# Patient Record
Sex: FEMALE | Race: BLACK OR AFRICAN AMERICAN | Employment: PART TIME | URBAN - METROPOLITAN AREA
[De-identification: names, ages, dates, MRNs, and addresses within clinical notes are randomized per-mention and may not be internally consistent; named-entity substitution may affect disease eponyms.]

---

## 2017-03-29 ENCOUNTER — HOSPITAL ENCOUNTER (INPATIENT)
Facility: HOSPITAL | Age: 22
LOS: 2 days | Discharge: HOME | DRG: 638 | End: 2017-03-31
Attending: EMERGENCY MEDICINE | Admitting: HOSPITALIST
Payer: MEDICAID

## 2017-03-29 ENCOUNTER — APPOINTMENT (OUTPATIENT)
Dept: ULTRASOUND IMAGING | Facility: HOSPITAL | Age: 22
DRG: 638 | End: 2017-03-29
Attending: HOSPITALIST
Payer: MEDICAID

## 2017-03-29 DIAGNOSIS — IMO0001 INSULIN DEPENDENT DIABETES MELLITUS: ICD-10-CM

## 2017-03-29 DIAGNOSIS — E87.1 HYPONATREMIA: ICD-10-CM

## 2017-03-29 DIAGNOSIS — R10.13 EPIGASTRIC PAIN: ICD-10-CM

## 2017-03-29 DIAGNOSIS — R73.9 HYPERGLYCEMIA: Primary | ICD-10-CM

## 2017-03-29 PROCEDURE — 76700 US EXAM ABDOM COMPLETE: CPT

## 2017-03-29 PROCEDURE — 99223 1ST HOSP IP/OBS HIGH 75: CPT | Performed by: HOSPITALIST

## 2017-03-29 RX ORDER — DEXTROSE AND SODIUM CHLORIDE 5; .45 G/100ML; G/100ML
100 INJECTION, SOLUTION INTRAVENOUS CONTINUOUS PRN
Status: DISCONTINUED | OUTPATIENT
Start: 2017-03-29 | End: 2017-03-31

## 2017-03-29 RX ORDER — FAMOTIDINE 10 MG/ML
20 INJECTION, SOLUTION INTRAVENOUS ONCE
Status: COMPLETED | OUTPATIENT
Start: 2017-03-29 | End: 2017-03-29

## 2017-03-29 RX ORDER — KETOROLAC TROMETHAMINE 30 MG/ML
30 INJECTION, SOLUTION INTRAMUSCULAR; INTRAVENOUS EVERY 6 HOURS PRN
Status: DISCONTINUED | OUTPATIENT
Start: 2017-03-29 | End: 2017-03-31

## 2017-03-29 RX ORDER — DEXTROSE MONOHYDRATE 25 G/50ML
50 INJECTION, SOLUTION INTRAVENOUS
Status: DISCONTINUED | OUTPATIENT
Start: 2017-03-29 | End: 2017-03-31

## 2017-03-29 RX ORDER — HEPARIN SODIUM 5000 [USP'U]/ML
5000 INJECTION, SOLUTION INTRAVENOUS; SUBCUTANEOUS EVERY 8 HOURS
Status: DISCONTINUED | OUTPATIENT
Start: 2017-03-29 | End: 2017-03-31

## 2017-03-29 RX ORDER — ONDANSETRON 2 MG/ML
4 INJECTION INTRAMUSCULAR; INTRAVENOUS EVERY 6 HOURS PRN
Status: DISCONTINUED | OUTPATIENT
Start: 2017-03-29 | End: 2017-03-31

## 2017-03-29 RX ORDER — SODIUM CHLORIDE 9 MG/ML
INJECTION, SOLUTION INTRAVENOUS CONTINUOUS
Status: DISCONTINUED | OUTPATIENT
Start: 2017-03-29 | End: 2017-03-31

## 2017-03-29 RX ORDER — DEXTROSE MONOHYDRATE 25 G/50ML
50 INJECTION, SOLUTION INTRAVENOUS
Status: DISCONTINUED | OUTPATIENT
Start: 2017-03-29 | End: 2017-03-29

## 2017-03-29 RX ORDER — KETOROLAC TROMETHAMINE 30 MG/ML
15 INJECTION, SOLUTION INTRAMUSCULAR; INTRAVENOUS EVERY 6 HOURS PRN
Status: DISCONTINUED | OUTPATIENT
Start: 2017-03-29 | End: 2017-03-31

## 2017-03-29 NOTE — ED INITIAL ASSESSMENT (HPI)
Pt with on and off abdominal pain for the last month the is epigastric but also having back pain and lower back cramps.

## 2017-03-30 PROCEDURE — 99233 SBSQ HOSP IP/OBS HIGH 50: CPT | Performed by: INTERNAL MEDICINE

## 2017-03-30 NOTE — CONSULTS
[facility]      Endocrinology Consultation    Osmar Alonso Patient Status:  Inpatient    1995 MRN [id number]   [facility] 4SW-A Attending Garrison Corbett MD   Hosp Day # 1 PCP None Pcp     Reason for Consultation:  DKA    Hi reviewed. No pertinent past surgical history. Family History   Problem Relation Age of Onset   • Cancer Maternal Grandmother    • Diabetes Paternal Grandfather    • Diabetes Sister    • Diabetes Brother       reports that she has never smoked.  She does no stated age  Eyes: no proptosis, lid lag, or stare  Neck:  no thyromegaly   Lymph: no anterior cervical or supraclavicular lymphadenopathy  CV: regular rate and rhythm, nl S1/S2, no LE edema bilaterally  Resp: clear to auscultation bilaterally, non-labored went down to 125 and glucose is in the 400s again.   - Continue levemir 15 units Q 12 hours   - Novolog 1:10 grams of carbs  - Novolog 1:10 > 140 QID  - Will follow and adjust. Likely can go home tomorrow after 1 more day of observation.  - Will need lantu

## 2017-03-30 NOTE — PLAN OF CARE
Diabetes/Glucose Control    • Glucose maintained within prescribed range Progressing        Patient/Family Goals    • Patient/Family Long Term Goal Progressing    • Patient/Family Short Term Goal Progressing        Received patient this am awake and alert.

## 2017-03-30 NOTE — CONSULTS
[facility]  Diabetes Clinical Nurse Specialist Consult Note    Nimco Harrington Patient Status:  Inpatient    1995 MRN [id number]   [facility] 4SW-A Attending David Abdalla MD   Hosp Day # 1 PCP None Pcp     Reason for [facility] peanut butter, reviewed the rule of 15 and reason for 15 grams of pure carbohydrate. She said that she will go low after going high if having pure carbohydrate, I reinforced that she can have a protein snack after correcting the low blood sugar.  \"I haven'

## 2017-03-30 NOTE — ED NOTES
Unable to give bedside report because patient is in ultrasound. Report given to [facility] and floor nurse.

## 2017-03-30 NOTE — H&P
[facility]  History and Physical     Royce Hatter Patient Status:  Emergency    1995 MRN [id number]   Location [address] [facility] Attending Goldy Gooden MD   Hosp Day # 0 PCP None Pcp     Chief Complaint: Kari Alamo Respiratory: Clear to auscultation bilaterally. Cardiovascular: S1, S2. Regular rate and rhythm. No murmurs, rubs or gallops. Equal pulses. Abdomen: Soft, tender epigastric and RUQ, nondistended. Positive bowel sounds.  No rebound, guarding or organo

## 2017-03-30 NOTE — PROGRESS NOTES
ICU  Critical Care APN Progress Note    NAME: Freeman Rangel - ROOM: [facility]-A - MRN: [id number] - Age: 25year old - :1995    History Of Present Illness:  Freeman Rangel is a 25year old female with PMHx significant for T1DM since 4yrs of age-- atraumatic, no cyanosis or edema,capillary refill <3 sec.     Pulses: 2+ and symmetric all extremities  Skin: Skin color, texture, turgor normal for ethnicity, no rashes or lesions, warm and dry  Neurologic: CNII-XII intact, normal strength    Data this adm D5.45NS once glucose is below 250 mg/dl  -Follow electrolytes and replete as incdicated  -NPO except sips with meds or ice chips--eat when okay with endo    Proph:  -SQ heparin  -Protonix  -SCD    Dispo:   -Full code  -ICU for glucose management and close

## 2017-03-30 NOTE — ED NOTES
Assumed care of patient who is currently at [address] to ICU per Guillermina RN will handoff Insulin with ICU

## 2017-03-30 NOTE — DISCHARGE SUMMARY
[facility]  DISCHARGE SUMMARY     Nimco Harrington Patient Status:  Inpatient    1995 MRN [id number]   [facility] 4SW-A Attending David Abdalla MD   [facility] Day # 1 PCP None Pcp     Date of Admission: 3/29/2017  Date of Discha and her pain was controlled prior to DC.      Procedures during hospitalization:   • none    Incidental or significant findings and recommendations (brief descriptions):  • none    Lab/Test results pending at Discharge:   · none    Consultants:  • CC, Endoc

## 2017-03-30 NOTE — PROGRESS NOTES
[facility]  Progress Note     Tobias Negron Patient Status:  Inpatient    1995 MRN [id number]   [facility] 4SW-A Attending Bassam Singleton MD   [facility] Day # 1 PCP None Pcp     Chief Complaint: hyperglycemia    S: Patient wit detemir  15 Units Subcutaneous Q12H   • insulin aspart  1-30 Units Subcutaneous TID CC   • insulin aspart  1-30 Units Subcutaneous TID CC and HS   • Heparin Sodium (Porcine)  5,000 Units Subcutaneous Q8H   • pantoprazole (PROTONIX) IV push  40 mg Intraveno

## 2017-03-30 NOTE — CONSULTS
Pulmonary / Critical Care H&P/Consult       NAME: Fabienne Snowden - ROOM: 453/453-A - MRN: [id number] - Age: 25year old - :  1995    Date of Admission: 3/29/2017  6:01 PM  Admission Diagnosis: Hyponatremia [E87.1]  Epigastric pain [R10.13]  Hyperg daily. Disp:  Rfl:        Scheduled Medication:  • Heparin Sodium (Porcine)  5,000 Units Subcutaneous Q8H   • pantoprazole (PROTONIX) IV push  40 mg Intravenous Q24H     Continuous Infusing Medication:  • sodium chloride 150 mL/hr at 03/29/17 2205   • Dext gallop   Abdomen:     Soft, mild ruq / epigastric pain, no r/g.  + bs   Extremities:   Extremities normal, atraumatic, no cyanosis or edema   Pulses:   2+ and symmetric all extremities   Skin:   Skin color, texture, turgor normal, no rashes or lesions

## 2017-03-30 NOTE — PAYOR COMM NOTE
Attending Physician: Giuseppe Jackson MD    Review Type: ADMISSION   Reviewer: Kiana Lora       Date: March 30, 2017 - 1:35 PM  Payor: Junaid Flores  Authorization Number: N/A  Admit date: 3/29/2017  6:01 PM   Admitted from Emergency Dept.: yes    H&P by T facility-administered medications on file prior to encounter. No current outpatient prescriptions on file prior to encounter.     Review of Systems:   A comprehensive 14 point review of systems was completed.     Pertinent positives and negatives noted in drip  3. Accuchecks  4. Serial BMP q 8 hours  5.  Endo consult    Quality:  · DVT Prophylaxis: Heparin    Plan of care discussed with patient and ER team.    Vin Fajardo MD  3/29/2017                      MEDICATIONS ADMINISTERED IN LAST 1 DAY:  dextrose 3/30/2017 0521 New Bag 0.5 Units/hr Intravenous Ann-Marie Morales RN    3/30/2017 0306 Rate/Dose Change 2 Units/hr Intravenous Ann-Marie Morales RN    3/30/2017 0209 Rate/Dose Change 3 Units/hr Intravenous Ann-Marie Morales RN    3/30/2017 0107 Rate/Dose C normal limits   HEMOGLOBIN A1C - Abnormal; Notable for the following:     HgbA1C 12.1 (*)     Estimated Average Glucose 301 (*)     All other components within normal limits   TRIGLYCERIDES - Abnormal; Notable for the following:     Triglycerides 132 (*) following:     POC Glucose 156 (*)     All other components within normal limits   POCT GLUCOSE - Abnormal; Notable for the following:     POC Glucose 118 (*)     All other components within normal limits   POCT GLUCOSE - Abnormal; Notable for the followin

## 2017-03-30 NOTE — DIETARY NOTE
Nutrition Short Note      Consult received for carb counting, pt type 1 diabetic. Pt states she is familiar with the diet. Reviewed and provided handouts on carbohydrate counting/label reading.  Discussed the role of CHO/protein and fat in BS control, jerry

## 2017-03-30 NOTE — PLAN OF CARE
Diabetes/Glucose Control    • Glucose maintained within prescribed range Progressing        Patient/Family Goals    • Patient/Family Long Term Goal Progressing    • Patient/Family Short Term Goal Progressing            Received pt from ED for abd pain and

## 2017-03-30 NOTE — ED PROVIDER NOTES
Patient Seen in: [facility] Emergency Department    History   Patient presents with:  Abdomen/Flank Pain (GI/)    Stated Complaint: abd pain    HPI    Patient is a 81-year-old had a history of insulin dependent diabetes he says that she has been h light reflex and normal landmarks. Oropharynx shows moist mucous membranes with no erythema or exudate. Uvula midline, no drooling, no stridor. Neck is supple with no lymphadenopathy or meningismus. CHEST: Lungs are clear to auscultation bilaterally. normal limits   CBC W/ DIFFERENTIAL - Abnormal; Notable for the following:     HGB 11.4 (*)     All other components within normal limits   MAGNESIUM - Normal   PHOSPHORUS - Normal   LIPASE - Normal   CBC WITH DIFFERENTIAL WITH PLATELET    Narrative:     T care.        Disposition and Plan     Clinical Impression:  Hyperglycemia  (primary encounter diagnosis)  Insulin dependent diabetes mellitus ([facility] [address]  Epigastric pain  Hyponatremia    Disposition:  Admit    Follow-up:  No follow-up provider specified.     Med

## 2017-03-31 VITALS
RESPIRATION RATE: 17 BRPM | BODY MASS INDEX: 25.7 KG/M2 | TEMPERATURE: 98 F | DIASTOLIC BLOOD PRESSURE: 58 MMHG | SYSTOLIC BLOOD PRESSURE: 106 MMHG | HEART RATE: 86 BPM | WEIGHT: 150.5 LBS | HEIGHT: 64 IN | OXYGEN SATURATION: 100 %

## 2017-03-31 PROCEDURE — 99239 HOSP IP/OBS DSCHRG MGMT >30: CPT | Performed by: INTERNAL MEDICINE

## 2017-03-31 RX ORDER — POTASSIUM CHLORIDE 20 MEQ/1
40 TABLET, EXTENDED RELEASE ORAL ONCE
Status: COMPLETED | OUTPATIENT
Start: 2017-03-31 | End: 2017-03-31

## 2017-03-31 RX ORDER — CALCIUM CARB/VITAMIN D3/VIT K1 500-100-40
TABLET,CHEWABLE ORAL
Qty: 100 EACH | Refills: 0 | Status: SHIPPED | OUTPATIENT
Start: 2017-03-31

## 2017-03-31 NOTE — PROGRESS NOTES
[facility]  Endocrinology Progress Note    Nimco Harrington Patient Status:  Inpatient    1995 MRN [id number]   [facility] 3NE-A Attending David Abdalla MD   Hosp Day # 2 PCP None Pcp     CC: Patient presents with:  Abdomen/Fl qam and 15 qpm and humalog pens 20 TID with meals and pt has follow-up appointment 4/6/17 at the [facility] [place]      Plan of care discussed with patient/family at bedside. All questions/concerns addressed as fully as possible.      Endocrine service

## 2017-03-31 NOTE — PLAN OF CARE
NURSING DISCHARGE NOTE    Discharged Home via Ambulatory. Accompanied by self  Belongings Taken by patient/family. Patient given discharge instructions and paperwork. Verbalizes understanding.

## 2017-03-31 NOTE — PLAN OF CARE
Assumed patient care at 0. Patient A&O x 4 with no complaints of pain or discomfort at this time. Patient appetite increasing. Insulin given overnight per MAR. VSS. Possible D/C tomorrow pending patient status.  Patient is currently lying in bed, bed is

## 2017-06-20 ENCOUNTER — OFFICE VISIT (OUTPATIENT)
Dept: ENDOCRINOLOGY CLINIC | Facility: CLINIC | Age: 22
End: 2017-06-20

## 2017-06-20 VITALS
TEMPERATURE: 98 F | SYSTOLIC BLOOD PRESSURE: 106 MMHG | HEART RATE: 88 BPM | HEIGHT: 64 IN | WEIGHT: 152 LBS | DIASTOLIC BLOOD PRESSURE: 68 MMHG | BODY MASS INDEX: 25.95 KG/M2 | RESPIRATION RATE: 18 BRPM

## 2017-06-20 DIAGNOSIS — E10.69 UNCONTROLLED TYPE 1 DIABETES MELLITUS WITH OTHER SPECIFIED COMPLICATION: Primary | ICD-10-CM

## 2017-06-20 DIAGNOSIS — E10.10 DIABETIC KETOACIDOSIS WITHOUT COMA ASSOCIATED WITH TYPE 1 DIABETES MELLITUS (HCC): ICD-10-CM

## 2017-06-20 DIAGNOSIS — E10.65 UNCONTROLLED TYPE 1 DIABETES MELLITUS WITH OTHER SPECIFIED COMPLICATION: Primary | ICD-10-CM

## 2017-06-20 PROBLEM — IMO0002 UNCONTROLLED TYPE 1 DIABETES MELLITUS: Status: ACTIVE | Noted: 2017-06-20

## 2017-06-20 PROCEDURE — 99214 OFFICE O/P EST MOD 30 MIN: CPT | Performed by: NURSE PRACTITIONER

## 2017-06-20 PROCEDURE — 83036 HEMOGLOBIN GLYCOSYLATED A1C: CPT | Performed by: NURSE PRACTITIONER

## 2017-06-21 NOTE — PROGRESS NOTES
CC: Patient presents with:  Diabetes: new pt. referred by hospital.      HISTORY:  Past Medical History   Diagnosis Date   • Type 1 diabetes mellitus ([facility] [address]    • Hyperlipidemia    • Anxiety    • Insomnia 2/2017      No past surgical history on file.    Galileo Galicia 230,15,298,491,[phone] mg/dl late day: 176 mg/dl    Hypoglycemia frequency early am 2 episodes, late am 1 episode   Hyperlipidemia: severe around most of midsection of abdomen where she mainly injects and thighs     Hypertension:  Blood Pressure: daily, Disp: 400 each, Rfl: 0  •  Glucose Blood (ONETOUCH TEST) In Vitro Strip, 4 times daily, Disp: 400 each, Rfl: 0    Exam:  /68 mmHg  Pulse 88  Temp(Src) 98.2 °F (36.8 °C) (Oral)  Resp 18  Ht 64\"  Wt 152 lb  BMI 26.08 kg/m2  LMP 06/12/2017 (Exac insulin adjustment.          Orders Placed This Encounter  Hgb A1C    Meds & Refills for this Visit:  Signed Prescriptions Disp Refills    Insulin Lispro (HUMALOG KWIKPEN) 100 UNIT/ML Subcutaneous Solution Pen-injector 30 mL 0      Sig: Inject 20 Units into

## 2017-07-05 ENCOUNTER — HOSPITAL (OUTPATIENT)
Dept: OTHER | Age: 22
End: 2017-07-05
Attending: EMERGENCY MEDICINE

## 2017-07-05 LAB
ALBUMIN SERPL-MCNC: 3.4 GM/DL (ref 3.6–5.1)
ALBUMIN/GLOB SERPL: 0.7 {RATIO} (ref 1–2.4)
ALP SERPL-CCNC: 107 UNIT/L (ref 45–117)
ALT SERPL-CCNC: 27 UNIT/L
AMORPH SED URNS QL MICRO: ABNORMAL
ANALYZER ANC (IANC): ABNORMAL
ANION GAP SERPL CALC-SCNC: 15 MMOL/L (ref 10–20)
APPEARANCE UR: CLEAR
APPEARANCE UR: CLEAR
AST SERPL-CCNC: 29 UNIT/L
BASOPHILS # BLD: 0 THOUSAND/MCL (ref 0–0.3)
BASOPHILS NFR BLD: 0 %
BILIRUB SERPL-MCNC: 0.2 MG/DL (ref 0.2–1)
BILIRUB UR QL STRIP: NEGATIVE
BILIRUB UR QL: NEGATIVE
BUN SERPL-MCNC: 19 MG/DL (ref 6–20)
BUN/CREAT SERPL: 39 (ref 7–25)
CALCIUM SERPL-MCNC: 9.1 MG/DL (ref 8.4–10.2)
CAOX CRY URNS QL MICRO: ABNORMAL
CHLORIDE: 107 MMOL/L (ref 98–107)
CO2 SERPL-SCNC: 25 MMOL/L (ref 21–32)
COLOR UR: YELLOW
COLOR UR: YELLOW
CREAT SERPL-MCNC: 0.49 MG/DL (ref 0.51–0.95)
DIFFERENTIAL METHOD BLD: ABNORMAL
EOSINOPHIL # BLD: 0.2 THOUSAND/MCL (ref 0.1–0.5)
EOSINOPHIL NFR BLD: 2 %
EPITH CASTS #/AREA URNS LPF: ABNORMAL /[LPF]
ERYTHROCYTE [DISTWIDTH] IN BLOOD: 13.3 % (ref 11–15)
FATTY CASTS #/AREA URNS LPF: ABNORMAL /[LPF]
GLOBULIN SER-MCNC: 4.8 GM/DL (ref 2–4)
GLUCOSE BLDC GLUCOMTR-MCNC: 140 MG/DL (ref 65–99)
GLUCOSE BLDC GLUCOMTR-MCNC: 248 MG/DL (ref 65–99)
GLUCOSE BLDC GLUCOMTR-MCNC: 53 MG/DL (ref 65–99)
GLUCOSE SERPL-MCNC: 109 MG/DL (ref 65–99)
GLUCOSE UR STRIP-MCNC: 500 MG/DL
GLUCOSE UR-MCNC: >500 MG/DL
GRAN CASTS #/AREA URNS LPF: ABNORMAL /[LPF]
HCG POINT OF CARE (5HGRST): NEGATIVE
HEMATOCRIT: 40.3 % (ref 36–46.5)
HEMOCCULT STL QL: NEGATIVE
HG POINT OF CARE QC: NORMAL
HGB BLD-MCNC: 13.2 GM/DL (ref 12–15.5)
HGB UR QL: NEGATIVE
HYALINE CASTS #/AREA URNS LPF: ABNORMAL /[LPF]
KETONES UR STRIP-MCNC: NEGATIVE MG/DL
KETONES UR-MCNC: NEGATIVE MG/DL
LEUKOCYTE ESTERASE UR QL STRIP: NEGATIVE
LEUKOCYTE ESTERASE UR QL STRIP: NEGATIVE
LIPASE SERPL-CCNC: 113 UNIT/L (ref 73–393)
LYMPHOCYTES # BLD: 1.1 THOUSAND/MCL (ref 1.2–5.2)
LYMPHOCYTES NFR BLD: 10 %
MCH RBC QN AUTO: 27.2 PG (ref 26–34)
MCHC RBC AUTO-ENTMCNC: 32.8 GM/DL (ref 32–36.5)
MCV RBC AUTO: 82.9 FL (ref 78–100)
MICROSCOPIC (MT): ABNORMAL
MIXED CELL CASTS #/AREA URNS LPF: ABNORMAL /[LPF]
MONOCYTES # BLD: 0.8 THOUSAND/MCL (ref 0.3–0.9)
MONOCYTES NFR BLD: 7 %
MUCOUS THREADS URNS QL MICRO: ABNORMAL
NEUTROPHILS # BLD: 9.4 THOUSAND/MCL (ref 1.8–8)
NEUTROPHILS NFR BLD: 81 %
NEUTS SEG NFR BLD: ABNORMAL %
NITRITE UR QL STRIP: NEGATIVE
NITRITE UR QL: NEGATIVE
PERCENT NRBC: ABNORMAL
PH UR STRIP: 7 UNIT (ref 5–7)
PH UR: 7 UNIT (ref 5–7)
PLATELET # BLD: 300 THOUSAND/MCL (ref 140–450)
POTASSIUM SERPL-SCNC: 3.3 MMOL/L (ref 3.4–5.1)
PROT SERPL-MCNC: 8.2 GM/DL (ref 6.4–8.2)
PROT UR QL: NEGATIVE MG/DL
PROT UR STRIP-MCNC: ABNORMAL MG/DL
RBC # BLD: 4.86 MILLION/MCL (ref 4–5.2)
RBC CASTS #/AREA URNS LPF: ABNORMAL /[LPF]
RENAL EPI CELLS #/AREA URNS HPF: ABNORMAL /[HPF]
SODIUM SERPL-SCNC: 144 MMOL/L (ref 135–145)
SP GR UR STRIP: 1.02 (ref 1–1.03)
SP GR UR: 1.02 (ref 1–1.03)
SPECIMEN SOURCE: ABNORMAL
SPERM URNS QL MICRO: ABNORMAL
T VAGINALIS URNS QL MICRO: ABNORMAL
TRI-PHOS CRY URNS QL MICRO: ABNORMAL
URATE CRY URNS QL MICRO: ABNORMAL
URNS CMNT MICRO: ABNORMAL
UROBILINOGEN UR QL: 0.2 MG/DL (ref 0–1)
UROBILINOGEN UR STRIP-MCNC: 0.2 MG/DL (ref 0–1)
WAXY CASTS #/AREA URNS LPF: ABNORMAL /[LPF]
WBC # BLD: 11.5 THOUSAND/MCL (ref 4.2–11)
WBC CASTS #/AREA URNS LPF: ABNORMAL /[LPF]
YEAST HYPHAE URNS QL MICRO: ABNORMAL
YEAST URNS QL MICRO: ABNORMAL

## 2017-07-24 ENCOUNTER — HOSPITAL (OUTPATIENT)
Dept: OTHER | Age: 22
End: 2017-07-24
Attending: FAMILY MEDICINE

## 2017-07-24 LAB
ALBUMIN SERPL-MCNC: 2.6 GM/DL (ref 3.6–5.1)
ALBUMIN SERPL-MCNC: 3.2 GM/DL (ref 3.6–5.1)
ALBUMIN/GLOB SERPL: 0.6 {RATIO} (ref 1–2.4)
ALBUMIN/GLOB SERPL: 0.7 {RATIO} (ref 1–2.4)
ALP SERPL-CCNC: 114 UNIT/L (ref 45–117)
ALP SERPL-CCNC: 145 UNIT/L (ref 45–117)
ALT SERPL-CCNC: 16 UNIT/L
ALT SERPL-CCNC: 19 UNIT/L
AMORPH SED URNS QL MICRO: ABNORMAL
AMPHETAMINES UR QL SCN>500 NG/ML: NEGATIVE
ANALYZER ANC (IANC): ABNORMAL
ANALYZER ANC (IANC): ABNORMAL
ANION GAP SERPL CALC-SCNC: 17 MMOL/L (ref 10–20)
ANION GAP SERPL CALC-SCNC: 18 MMOL/L (ref 10–20)
ANION GAP SERPL CALC-SCNC: 19 MMOL/L (ref 10–20)
APAP SERPL-MCNC: <2 MCG/ML (ref 10–30)
APPEARANCE UR: CLEAR
AST SERPL-CCNC: 25 UNIT/L
AST SERPL-CCNC: 26 UNIT/L
B-OH-BUTYR SERPL-SCNC: 0.3 MMOL/L (ref 0–0.3)
B-OH-BUTYR SERPL-SCNC: 0.4 MMOL/L (ref 0–0.3)
BACTERIA #/AREA URNS HPF: ABNORMAL /HPF
BARBITURATES UR QL SCN>200 NG/ML: NEGATIVE
BASE DEFICIT BLDA-SCNC: 7 MMOL/L (ref 0–2)
BASE EXCESS BLDA CALC-SCNC: ABNORMAL MMOL/L
BASOPHILS # BLD: 0 THOUSAND/MCL (ref 0–0.3)
BASOPHILS # BLD: 0 THOUSAND/MCL (ref 0–0.3)
BASOPHILS NFR BLD: 0 %
BASOPHILS NFR BLD: 1 %
BDY SITE: ABNORMAL
BENZODIAZ UR QL SCN>200 NG/ML: NEGATIVE
BILIRUB SERPL-MCNC: 0.2 MG/DL (ref 0.2–1)
BILIRUB SERPL-MCNC: 0.2 MG/DL (ref 0.2–1)
BILIRUB UR QL: NEGATIVE
BODY TEMPERATURE: ABNORMAL
BUN SERPL-MCNC: 13 MG/DL (ref 6–20)
BUN SERPL-MCNC: 13 MG/DL (ref 6–20)
BUN SERPL-MCNC: 17 MG/DL (ref 6–20)
BUN/CREAT SERPL: 22 (ref 7–25)
BUN/CREAT SERPL: 25 (ref 7–25)
BUN/CREAT SERPL: 30 (ref 7–25)
BZE UR QL SCN>150 NG/ML: NEGATIVE
CA-I BLD ISE-SCNC: 1.24 MMOL/L (ref 1.15–1.29)
CA-I BLDA-SCNC: 15 % (ref 15–23)
CALCIUM SERPL-MCNC: 7.8 MG/DL (ref 8.4–10.2)
CALCIUM SERPL-MCNC: 8.5 MG/DL (ref 8.4–10.2)
CALCIUM SERPL-MCNC: 9.3 MG/DL (ref 8.4–10.2)
CANNABINOIDS UR QL SCN>50 NG/ML: NEGATIVE
CAOX CRY URNS QL MICRO: ABNORMAL
CHLORIDE BLD-SCNC: 106 MMOL/L (ref 98–107)
CHLORIDE: 103 MMOL/L (ref 98–107)
CHLORIDE: 108 MMOL/L (ref 98–107)
CHLORIDE: 99 MMOL/L (ref 98–107)
CO2 SERPL-SCNC: 18 MMOL/L (ref 21–32)
CO2 SERPL-SCNC: 19 MMOL/L (ref 21–32)
CO2 SERPL-SCNC: 25 MMOL/L (ref 21–32)
COHGB MFR BLD: 0.6 %
COLOR UR: COLORLESS
CONDITION: ABNORMAL
CONDITION: ABNORMAL
CREAT SERPL-MCNC: 0.52 MG/DL (ref 0.51–0.95)
CREAT SERPL-MCNC: 0.57 MG/DL (ref 0.51–0.95)
CREAT SERPL-MCNC: 0.6 MG/DL (ref 0.51–0.95)
DIFFERENTIAL METHOD BLD: ABNORMAL
DIFFERENTIAL METHOD BLD: ABNORMAL
EOSINOPHIL # BLD: 0.1 THOUSAND/MCL (ref 0.1–0.5)
EOSINOPHIL # BLD: 0.3 THOUSAND/MCL (ref 0.1–0.5)
EOSINOPHIL NFR BLD: 2 %
EOSINOPHIL NFR BLD: 4 %
EPITH CASTS #/AREA URNS LPF: ABNORMAL /[LPF]
ERYTHROCYTE [DISTWIDTH] IN BLOOD: 13.6 % (ref 11–15)
ERYTHROCYTE [DISTWIDTH] IN BLOOD: 13.9 % (ref 11–15)
ETHANOL SERPL-MCNC: NORMAL MG/DL
FATTY CASTS #/AREA URNS LPF: ABNORMAL /[LPF]
GLOBULIN SER-MCNC: 4.3 GM/DL (ref 2–4)
GLOBULIN SER-MCNC: 4.9 GM/DL (ref 2–4)
GLUCOSE BLD-MCNC: 423 MG/DL (ref 65–99)
GLUCOSE BLDC GLUCOMTR-MCNC: 201 MG/DL (ref 65–99)
GLUCOSE BLDC GLUCOMTR-MCNC: 201 MG/DL (ref 65–99)
GLUCOSE BLDC GLUCOMTR-MCNC: 259 MG/DL (ref 65–99)
GLUCOSE BLDC GLUCOMTR-MCNC: 287 MG/DL (ref 65–99)
GLUCOSE BLDC GLUCOMTR-MCNC: 303 MG/DL (ref 65–99)
GLUCOSE BLDC GLUCOMTR-MCNC: 422 MG/DL (ref 65–99)
GLUCOSE SERPL-MCNC: 239 MG/DL (ref 65–99)
GLUCOSE SERPL-MCNC: 384 MG/DL (ref 65–99)
GLUCOSE SERPL-MCNC: 500 MG/DL (ref 65–99)
GLUCOSE UR-MCNC: >500 MG/DL
GRAN CASTS #/AREA URNS LPF: ABNORMAL /[LPF]
HCG POINT OF CARE (5HGRST): NEGATIVE
HCO3 BLDA-SCNC: 18 MMOL/L (ref 22–28)
HCT VFR BLD CALC: 33.4 % (ref 36–46.5)
HEMATOCRIT: 32.3 % (ref 36–46.5)
HEMATOCRIT: 36.1 % (ref 36–46.5)
HG POINT OF CARE QC: NORMAL
HGB BLD-MCNC: 10.5 GM/DL (ref 12–15.5)
HGB BLD-MCNC: 10.9 GM/DL (ref 12–15.5)
HGB BLD-MCNC: 11.4 GM/DL (ref 12–15.5)
HGB UR QL: NEGATIVE
HOROWITZ INDEX BLD+IHG-RTO: ABNORMAL MM[HG]
HYALINE CASTS #/AREA URNS LPF: ABNORMAL /LPF (ref 0–5)
KETONES UR-MCNC: 20 MG/DL
LACTATE BLDA-MCNC: 4 MMOL/L
LEUKOCYTE ESTERASE UR QL STRIP: NEGATIVE
LYMPHOCYTES # BLD: 1.6 THOUSAND/MCL (ref 1.2–5.2)
LYMPHOCYTES # BLD: 1.9 THOUSAND/MCL (ref 1.2–5.2)
LYMPHOCYTES NFR BLD: 23 %
LYMPHOCYTES NFR BLD: 29 %
MCH RBC QN AUTO: 26.8 PG (ref 26–34)
MCH RBC QN AUTO: 27.1 PG (ref 26–34)
MCHC RBC AUTO-ENTMCNC: 31.6 GM/DL (ref 32–36.5)
MCHC RBC AUTO-ENTMCNC: 32.5 GM/DL (ref 32–36.5)
MCV RBC AUTO: 83.2 FL (ref 78–100)
MCV RBC AUTO: 84.9 FL (ref 78–100)
METHGB MFR BLD: 0.6 %
MIXED CELL CASTS #/AREA URNS LPF: ABNORMAL /[LPF]
MONOCYTES # BLD: 0.3 THOUSAND/MCL (ref 0.3–0.9)
MONOCYTES # BLD: 0.5 THOUSAND/MCL (ref 0.3–0.9)
MONOCYTES NFR BLD: 4 %
MONOCYTES NFR BLD: 8 %
MUCOUS THREADS URNS QL MICRO: ABNORMAL
NEUTROPHILS # BLD: 3.8 THOUSAND/MCL (ref 1.8–8)
NEUTROPHILS # BLD: 4.8 THOUSAND/MCL (ref 1.8–8)
NEUTROPHILS NFR BLD: 58 %
NEUTROPHILS NFR BLD: 71 %
NEUTS SEG NFR BLD: ABNORMAL %
NEUTS SEG NFR BLD: ABNORMAL %
NITRITE UR QL: NEGATIVE
OPIATES UR QL SCN>300 NG/ML: NEGATIVE
OXYHGB MFR BLD: 97.2 % (ref 94–98)
PCO2 BLDA: 33 MM HG (ref 32–45)
PCP UR QL SCN>25 NG/ML: NEGATIVE
PERCENT NRBC: ABNORMAL
PERCENT NRBC: ABNORMAL
PH BLDA: 7.34 UNIT (ref 7.35–7.45)
PH UR: 5 UNIT (ref 5–7)
PLATELET # BLD: 370 THOUSAND/MCL (ref 140–450)
PLATELET # BLD: 420 THOUSAND/MCL (ref 140–450)
PO2 BLDA: 104 MM HG (ref 83–108)
POTASSIUM BLD-SCNC: 4.7 MMOL/L (ref 3.4–5.1)
POTASSIUM SERPL-SCNC: 4.5 MMOL/L (ref 3.4–5.1)
POTASSIUM SERPL-SCNC: 4.5 MMOL/L (ref 3.4–5.1)
POTASSIUM SERPL-SCNC: 4.6 MMOL/L (ref 3.4–5.1)
PROT SERPL-MCNC: 6.9 GM/DL (ref 6.4–8.2)
PROT SERPL-MCNC: 8.1 GM/DL (ref 6.4–8.2)
PROT UR QL: NEGATIVE MG/DL
RBC # BLD: 3.88 MILLION/MCL (ref 4–5.2)
RBC # BLD: 4.25 MILLION/MCL (ref 4–5.2)
RBC #/AREA URNS HPF: ABNORMAL /HPF (ref 0–3)
RBC CASTS #/AREA URNS LPF: ABNORMAL /[LPF]
RENAL EPI CELLS #/AREA URNS HPF: ABNORMAL /[HPF]
SALICYLATES SERPL-MCNC: <2.8 MG/DL
SAO2 % BLDA: 98 % (ref 95–99)
SODIUM BLD-SCNC: 136 MMOL/L (ref 135–145)
SODIUM SERPL-SCNC: 135 MMOL/L (ref 135–145)
SODIUM SERPL-SCNC: 136 MMOL/L (ref 135–145)
SODIUM SERPL-SCNC: 140 MMOL/L (ref 135–145)
SP GR UR: 1.03 (ref 1–1.03)
SPECIMEN SOURCE: ABNORMAL
SPERM URNS QL MICRO: ABNORMAL
SQUAMOUS #/AREA URNS HPF: ABNORMAL /HPF (ref 0–5)
T VAGINALIS URNS QL MICRO: ABNORMAL
TRI-PHOS CRY URNS QL MICRO: ABNORMAL
TSH SERPL-ACNC: 0.7 MCUNIT/ML (ref 0.46–4.13)
URATE CRY URNS QL MICRO: ABNORMAL
URINE REFLEX: ABNORMAL
URNS CMNT MICRO: ABNORMAL
UROBILINOGEN UR QL: 0.2 MG/DL (ref 0–1)
WAXY CASTS #/AREA URNS LPF: ABNORMAL /[LPF]
WBC # BLD: 6.5 THOUSAND/MCL (ref 4.2–11)
WBC # BLD: 6.9 THOUSAND/MCL (ref 4.2–11)
WBC #/AREA URNS HPF: ABNORMAL /HPF (ref 0–5)
WBC CASTS #/AREA URNS LPF: ABNORMAL /[LPF]
YEAST HYPHAE URNS QL MICRO: ABNORMAL
YEAST URNS QL MICRO: ABNORMAL

## 2017-07-25 LAB
ALBUMIN SERPL-MCNC: 2.4 GM/DL (ref 3.6–5.1)
ALBUMIN/GLOB SERPL: 0.6 {RATIO} (ref 1–2.4)
ALP SERPL-CCNC: 115 UNIT/L (ref 45–117)
ALT SERPL-CCNC: 15 UNIT/L
ANALYZER ANC (IANC): ABNORMAL
ANION GAP SERPL CALC-SCNC: 13 MMOL/L (ref 10–20)
APTT PPP: 29 SECONDS (ref 22–30)
APTT PPP: NORMAL S
AST SERPL-CCNC: 16 UNIT/L
BASOPHILS # BLD: 0 THOUSAND/MCL (ref 0–0.3)
BASOPHILS NFR BLD: 0 %
BILIRUB SERPL-MCNC: 0.1 MG/DL (ref 0.2–1)
BUN SERPL-MCNC: 12 MG/DL (ref 6–20)
BUN/CREAT SERPL: 24 (ref 7–25)
CALCIUM SERPL-MCNC: 8.2 MG/DL (ref 8.4–10.2)
CHLORIDE: 105 MMOL/L (ref 98–107)
CO2 SERPL-SCNC: 22 MMOL/L (ref 21–32)
CREAT SERPL-MCNC: 0.5 MG/DL (ref 0.51–0.95)
DIFFERENTIAL METHOD BLD: ABNORMAL
EOSINOPHIL # BLD: 0.3 THOUSAND/MCL (ref 0.1–0.5)
EOSINOPHIL NFR BLD: 5 %
ERYTHROCYTE [DISTWIDTH] IN BLOOD: 14.1 % (ref 11–15)
GLOBULIN SER-MCNC: 4.2 GM/DL (ref 2–4)
GLUCOSE BLDC GLUCOMTR-MCNC: 104 MG/DL (ref 65–99)
GLUCOSE BLDC GLUCOMTR-MCNC: 104 MG/DL (ref 65–99)
GLUCOSE BLDC GLUCOMTR-MCNC: 142 MG/DL (ref 65–99)
GLUCOSE BLDC GLUCOMTR-MCNC: 187 MG/DL (ref 65–99)
GLUCOSE BLDC GLUCOMTR-MCNC: 188 MG/DL (ref 65–99)
GLUCOSE BLDC GLUCOMTR-MCNC: 247 MG/DL (ref 65–99)
GLUCOSE BLDC GLUCOMTR-MCNC: 26 MG/DL (ref 65–99)
GLUCOSE BLDC GLUCOMTR-MCNC: 268 MG/DL (ref 65–99)
GLUCOSE SERPL-MCNC: 225 MG/DL (ref 65–99)
HEMATOCRIT: 32.6 % (ref 36–46.5)
HGB BLD-MCNC: 10.6 GM/DL (ref 12–15.5)
INR PPP: 1
LACTATE BLDV-SCNC: 2.2 MMOL/L (ref 0–2)
LYMPHOCYTES # BLD: 2.2 THOUSAND/MCL (ref 1.2–5.2)
LYMPHOCYTES NFR BLD: 37 %
MAGNESIUM SERPL-MCNC: 1.5 MG/DL (ref 1.7–2.4)
MCH RBC QN AUTO: 27.2 PG (ref 26–34)
MCHC RBC AUTO-ENTMCNC: 32.5 GM/DL (ref 32–36.5)
MCV RBC AUTO: 83.6 FL (ref 78–100)
MONOCYTES # BLD: 0.4 THOUSAND/MCL (ref 0.3–0.9)
MONOCYTES NFR BLD: 8 %
NEUTROPHILS # BLD: 3 THOUSAND/MCL (ref 1.8–8)
NEUTROPHILS NFR BLD: 50 %
NEUTS SEG NFR BLD: ABNORMAL %
PERCENT NRBC: ABNORMAL
PHOSPHATE SERPL-MCNC: 2.7 MG/DL (ref 2.4–4.7)
PLATELET # BLD: 347 THOUSAND/MCL (ref 140–450)
POTASSIUM SERPL-SCNC: 3.9 MMOL/L (ref 3.4–5.1)
PROT SERPL-MCNC: 6.6 GM/DL (ref 6.4–8.2)
PROTHROMBIN TIME: 11.1 SECONDS (ref 9.7–11.8)
PROTHROMBIN TIME: NORMAL
RBC # BLD: 3.9 MILLION/MCL (ref 4–5.2)
SODIUM SERPL-SCNC: 136 MMOL/L (ref 135–145)
WBC # BLD: 5.9 THOUSAND/MCL (ref 4.2–11)

## 2017-07-26 ENCOUNTER — DIAGNOSTIC TRANS (OUTPATIENT)
Dept: OTHER | Age: 22
End: 2017-07-26

## 2017-08-12 ENCOUNTER — HOSPITAL ENCOUNTER (EMERGENCY)
Facility: HOSPITAL | Age: 22
Discharge: HOME | End: 2017-08-12
Attending: EMERGENCY MEDICINE
Payer: COMMERCIAL

## 2017-08-12 VITALS
BODY MASS INDEX: 24.66 KG/M2 | OXYGEN SATURATION: 98 % | WEIGHT: 148 LBS | DIASTOLIC BLOOD PRESSURE: 73 MMHG | SYSTOLIC BLOOD PRESSURE: 110 MMHG | RESPIRATION RATE: 20 BRPM | TEMPERATURE: 98 F | HEIGHT: 65 IN | HEART RATE: 101 BPM

## 2017-08-12 DIAGNOSIS — T74.21XA SEXUAL ASSAULT OF ADULT, INITIAL ENCOUNTER: Primary | ICD-10-CM

## 2017-08-12 LAB
POCT LOT NUMBER: NORMAL
POCT URINE PREGNANCY: NEGATIVE

## 2017-08-12 PROCEDURE — 99285 EMERGENCY DEPT VISIT HI MDM: CPT

## 2017-08-12 PROCEDURE — 96372 THER/PROPH/DIAG INJ SC/IM: CPT

## 2017-08-12 PROCEDURE — 81025 URINE PREGNANCY TEST: CPT

## 2017-08-12 RX ORDER — CEFTRIAXONE SODIUM 250 MG/1
250 INJECTION, POWDER, FOR SOLUTION INTRAMUSCULAR; INTRAVENOUS ONCE
Status: COMPLETED | OUTPATIENT
Start: 2017-08-12 | End: 2017-08-12

## 2017-08-12 RX ORDER — TRAZODONE HYDROCHLORIDE 100 MG/1
175 TABLET ORAL NIGHTLY
COMMUNITY
End: 2018-04-13

## 2017-08-12 RX ORDER — AZITHROMYCIN 250 MG/1
1000 TABLET, FILM COATED ORAL ONCE
Status: COMPLETED | OUTPATIENT
Start: 2017-08-12 | End: 2017-08-12

## 2017-08-12 RX ORDER — EMTRICITABINE AND TENOFOVIR DISOPROXIL FUMARATE 200; 300 MG/1; MG/1
1 TABLET, FILM COATED ORAL DAILY
Qty: 28 TABLET | Refills: 0 | Status: SHIPPED | OUTPATIENT
Start: 2017-08-12 | End: 2017-09-09

## 2017-08-12 RX ORDER — EMTRICITABINE AND TENOFOVIR DISOPROXIL FUMARATE 200; 300 MG/1; MG/1
1 TABLET, FILM COATED ORAL ONCE
Status: COMPLETED | OUTPATIENT
Start: 2017-08-12 | End: 2017-08-12

## 2017-08-12 RX ORDER — ESCITALOPRAM OXALATE 10 MG/1
50 TABLET ORAL DAILY
COMMUNITY
End: 2018-04-13

## 2017-08-12 RX ORDER — METRONIDAZOLE 500 MG/1
2000 TABLET ORAL ONCE
Status: COMPLETED | OUTPATIENT
Start: 2017-08-12 | End: 2017-08-12
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
## 2017-08-12 NOTE — ED NOTES
Sealed kit, lot [id number] with expiration date of 11/2019 showing, opened in front of patient. Kit completed per instructions in inside of lid.
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
## 2017-08-21 ENCOUNTER — APPOINTMENT (OUTPATIENT)
Dept: GENERAL RADIOLOGY | Facility: HOSPITAL | Age: 22
End: 2017-08-21
Attending: PEDIATRICS
Payer: COMMERCIAL

## 2017-08-21 ENCOUNTER — HOSPITAL ENCOUNTER (EMERGENCY)
Facility: HOSPITAL | Age: 22
Discharge: HOME | End: 2017-08-21
Attending: PEDIATRICS
Payer: COMMERCIAL

## 2017-08-21 VITALS
OXYGEN SATURATION: 96 % | RESPIRATION RATE: 18 BRPM | DIASTOLIC BLOOD PRESSURE: 83 MMHG | WEIGHT: 158 LBS | TEMPERATURE: 98 F | BODY MASS INDEX: 26.33 KG/M2 | HEIGHT: 65 IN | HEART RATE: 85 BPM | SYSTOLIC BLOOD PRESSURE: 119 MMHG

## 2017-08-21 DIAGNOSIS — S63.616A SPRAIN OF RIGHT LITTLE FINGER, UNSPECIFIED SITE OF FINGER, INITIAL ENCOUNTER: Primary | ICD-10-CM

## 2017-08-21 PROCEDURE — 99283 EMERGENCY DEPT VISIT LOW MDM: CPT

## 2017-08-21 PROCEDURE — 73140 X-RAY EXAM OF FINGER(S): CPT | Performed by: PEDIATRICS

## 2017-08-22 NOTE — ED PROVIDER NOTES
Patient Seen in: [facility] Emergency Department    History   Patient presents with:  Upper Extremity Injury (musculoskeletal)    Stated Complaint: finger inj    HPI    21-year-old female who is here with right fifth digit pain over the last 1 week negative. Positive for stated complaint: finger inj  Other systems are as noted in HPI. Constitutional and vital signs reviewed. All other systems reviewed and negative except as noted above.     PSFH elements reviewed from today and agreed excep acute findings. Dictated by: Sandra Parks MD on 8/21/2017 at 19:01     Approved by: Sandra Parks MD              Labs:  Personally reviewed any labs ordered.     Medications administered:  Medications - No data to display    Pulse oximetry:  Puls

## 2017-08-31 DIAGNOSIS — E10.10 DIABETIC KETOACIDOSIS WITHOUT COMA ASSOCIATED WITH TYPE 1 DIABETES MELLITUS (HCC): ICD-10-CM

## 2017-09-01 NOTE — TELEPHONE ENCOUNTER
Pt never f/u does she want to transfer care?  Or return for f/u please call with her insurance she needs to make a decision thanks

## 2017-09-01 NOTE — TELEPHONE ENCOUNTER
Medication(s) to Refill:   Pending Prescriptions Disp Refills    HUMALOG KWIKPEN 100 UNIT/ML Subcutaneous Solution Pen-injector [Pharmacy Med Name: HUMALOG 100 U/ML KWIKPEN INJ 5X3ML] 30 mL 0     Sig: ADMINISTER 20 UNITS UNDER THE SKIN THREE TIMES DAILY BE

## 2017-09-20 NOTE — TELEPHONE ENCOUNTER
Talked to the patient and she said she would like to f/u with Mac Tomas but needs to wait for insurance next month November.   Currently on [facility]

## 2017-10-27 DIAGNOSIS — E10.10 DIABETIC KETOACIDOSIS WITHOUT COMA ASSOCIATED WITH TYPE 1 DIABETES MELLITUS (HCC): ICD-10-CM

## 2017-10-27 RX ORDER — INSULIN LISPRO 100 [IU]/ML
INJECTION, SOLUTION INTRAVENOUS; SUBCUTANEOUS
Qty: 30 ML | Refills: 0 | Status: SHIPPED | OUTPATIENT
Start: 2017-10-27 | End: 2018-01-08

## 2018-01-08 ENCOUNTER — TELEPHONE (OUTPATIENT)
Dept: ENDOCRINOLOGY CLINIC | Facility: CLINIC | Age: 23
End: 2018-01-08

## 2018-01-08 DIAGNOSIS — E10.10 DIABETIC KETOACIDOSIS WITHOUT COMA ASSOCIATED WITH TYPE 1 DIABETES MELLITUS (HCC): ICD-10-CM

## 2018-01-09 RX ORDER — INSULIN LISPRO 100 [IU]/ML
INJECTION, SOLUTION INTRAVENOUS; SUBCUTANEOUS
Qty: 15 ML | Refills: 0 | Status: ON HOLD | OUTPATIENT
Start: 2018-01-09 | End: 2018-04-16

## 2018-01-09 NOTE — TELEPHONE ENCOUNTER
Please call pt hasn't been  In but one time, and has illinicare  Is she seeing another  Provider, change plans?    Can't fill meds without appts and covered plan   Haley VILLALPANDO,CDE

## 2018-01-23 NOTE — TELEPHONE ENCOUNTER
RIK Has MERIDIAN for insurance, our office does not take that. Provided Pt with what insurance we do take. Will call back if she can arrange different insurance.

## 2018-04-13 ENCOUNTER — HOSPITAL ENCOUNTER (EMERGENCY)
Facility: HOSPITAL | Age: 23
Discharge: HOME | End: 2018-04-14
Attending: EMERGENCY MEDICINE
Payer: MEDICAID

## 2018-04-13 DIAGNOSIS — R73.9 HYPERGLYCEMIA: Primary | ICD-10-CM

## 2018-04-13 PROCEDURE — 99284 EMERGENCY DEPT VISIT MOD MDM: CPT

## 2018-04-13 PROCEDURE — 81003 URINALYSIS AUTO W/O SCOPE: CPT

## 2018-04-13 PROCEDURE — 82962 GLUCOSE BLOOD TEST: CPT

## 2018-04-13 PROCEDURE — 96360 HYDRATION IV INFUSION INIT: CPT

## 2018-04-13 PROCEDURE — 96361 HYDRATE IV INFUSION ADD-ON: CPT

## 2018-04-13 PROCEDURE — 81003 URINALYSIS AUTO W/O SCOPE: CPT | Performed by: EMERGENCY MEDICINE

## 2018-04-13 PROCEDURE — 80053 COMPREHEN METABOLIC PANEL: CPT | Performed by: EMERGENCY MEDICINE

## 2018-04-13 PROCEDURE — 83036 HEMOGLOBIN GLYCOSYLATED A1C: CPT | Performed by: EMERGENCY MEDICINE

## 2018-04-13 PROCEDURE — 81025 URINE PREGNANCY TEST: CPT

## 2018-04-13 PROCEDURE — 85025 COMPLETE CBC W/AUTO DIFF WBC: CPT | Performed by: EMERGENCY MEDICINE

## 2018-04-13 PROCEDURE — 96372 THER/PROPH/DIAG INJ SC/IM: CPT

## 2018-04-13 RX ORDER — INSULIN ASPART 100 [IU]/ML
0.2 INJECTION, SOLUTION INTRAVENOUS; SUBCUTANEOUS ONCE
Status: COMPLETED | OUTPATIENT
Start: 2018-04-13 | End: 2018-04-13

## 2018-04-14 VITALS
OXYGEN SATURATION: 99 % | DIASTOLIC BLOOD PRESSURE: 78 MMHG | WEIGHT: 155 LBS | BODY MASS INDEX: 25.83 KG/M2 | HEART RATE: 86 BPM | RESPIRATION RATE: 16 BRPM | TEMPERATURE: 97 F | SYSTOLIC BLOOD PRESSURE: 135 MMHG | HEIGHT: 65 IN

## 2018-04-14 PROCEDURE — 82962 GLUCOSE BLOOD TEST: CPT

## 2018-04-14 NOTE — ED NOTES
Dr. Watst Congress informed that BS was 254. Pt stable, denies nausea, states she is hungry. No additional orders given for insulin.

## 2018-04-15 ENCOUNTER — HOSPITAL ENCOUNTER (INPATIENT)
Facility: HOSPITAL | Age: 23
LOS: 1 days | Discharge: HOME | DRG: 638 | End: 2018-04-16
Attending: EMERGENCY MEDICINE | Admitting: HOSPITALIST
Payer: MEDICAID

## 2018-04-15 DIAGNOSIS — E10.10 TYPE 1 DIABETES MELLITUS WITH KETOACIDOSIS WITHOUT COMA (HCC): Primary | ICD-10-CM

## 2018-04-15 DIAGNOSIS — E10.10 DIABETIC KETOACIDOSIS WITHOUT COMA ASSOCIATED WITH TYPE 1 DIABETES MELLITUS (HCC): ICD-10-CM

## 2018-04-15 PROCEDURE — 99223 1ST HOSP IP/OBS HIGH 75: CPT | Performed by: INTERNAL MEDICINE

## 2018-04-15 RX ORDER — DEXTROSE MONOHYDRATE 25 G/50ML
50 INJECTION, SOLUTION INTRAVENOUS
Status: DISCONTINUED | OUTPATIENT
Start: 2018-04-15 | End: 2018-04-15

## 2018-04-15 RX ORDER — SODIUM CHLORIDE 9 MG/ML
INJECTION, SOLUTION INTRAVENOUS CONTINUOUS
Status: DISCONTINUED | OUTPATIENT
Start: 2018-04-15 | End: 2018-04-15

## 2018-04-15 RX ORDER — DEXTROSE AND SODIUM CHLORIDE 5; .45 G/100ML; G/100ML
100 INJECTION, SOLUTION INTRAVENOUS CONTINUOUS PRN
Status: DISCONTINUED | OUTPATIENT
Start: 2018-04-15 | End: 2018-04-16

## 2018-04-15 RX ORDER — DEXTROSE MONOHYDRATE 25 G/50ML
50 INJECTION, SOLUTION INTRAVENOUS
Status: DISCONTINUED | OUTPATIENT
Start: 2018-04-15 | End: 2018-04-16

## 2018-04-15 RX ORDER — HEPARIN SODIUM 5000 [USP'U]/ML
5000 INJECTION, SOLUTION INTRAVENOUS; SUBCUTANEOUS EVERY 8 HOURS SCHEDULED
Status: DISCONTINUED | OUTPATIENT
Start: 2018-04-15 | End: 2018-04-16

## 2018-04-15 RX ORDER — ONDANSETRON 2 MG/ML
4 INJECTION INTRAMUSCULAR; INTRAVENOUS EVERY 6 HOURS PRN
Status: DISCONTINUED | OUTPATIENT
Start: 2018-04-15 | End: 2018-04-16

## 2018-04-15 RX ORDER — ACETAMINOPHEN 325 MG/1
650 TABLET ORAL EVERY 6 HOURS PRN
Status: DISCONTINUED | OUTPATIENT
Start: 2018-04-15 | End: 2018-04-16

## 2018-04-15 RX ORDER — DEXTROSE AND SODIUM CHLORIDE 5; .9 G/100ML; G/100ML
INJECTION, SOLUTION INTRAVENOUS CONTINUOUS
Status: DISCONTINUED | OUTPATIENT
Start: 2018-04-15 | End: 2018-04-15

## 2018-04-15 RX ORDER — MAGNESIUM OXIDE 400 MG (241.3 MG MAGNESIUM) TABLET
400 TABLET ONCE
Status: COMPLETED | OUTPATIENT
Start: 2018-04-15 | End: 2018-04-15

## 2018-04-15 RX ORDER — ONDANSETRON 2 MG/ML
4 INJECTION INTRAMUSCULAR; INTRAVENOUS EVERY 4 HOURS PRN
Status: DISCONTINUED | OUTPATIENT
Start: 2018-04-15 | End: 2018-04-16

## 2018-04-15 NOTE — ED PROVIDER NOTES
Patient Seen in: [facility] Emergency Department    History   Patient presents with:  Hyperglycemia (metabolic)  Nausea/Vomiting/Diarrhea (gastrointestinal)  Abdomen/Flank Pain (GI/)    Stated Complaint:     HPI  Patient is a 74-year-old female wh reactive to light and accommodation. Mouth normal, neck supple, no meningismus. LUNGS: Lungs clear to auscultation bilaterally. CARDIOVASCULAR: + S1-S2, regular rate and rhythm, no murmurs. BACK: No CVA tenderness, no midline bony tenderness.   ABDOMEN: RAINBOW DRAW LAVENDER   RAINBOW DRAW LIGHT GREEN   RAINBOW DRAW GOLD   CBC W/ DIFFERENTIAL   EKG    Rate, intervals and axes as noted on EKG Report.   Rate: 79  Rhythm: Sinus Rhythm  Reading: Normal sinus rhythm, no acute changes          ED Course as of

## 2018-04-15 NOTE — PLAN OF CARE
Pt. Follows commands, no complaints of pain, vitals stable, appetite is good, vitals stable, will continue to monitor.

## 2018-04-15 NOTE — ED INITIAL ASSESSMENT (HPI)
Patient seen here yesterday for hyperglycemia. She called EMS this am for n/v and found to have a BS of 541. She arrives complaining of nausea, abd pain, and SOB. She received zofran in the field and 200 ml bolus of 0.9 NS.  She has not taken her lantus or

## 2018-04-15 NOTE — CONSULTS
Pulmonary / Critical Care H&P/Consult       NAME: Ezequiel Jaeger - ROOM: [address] [place] [address] - MRN: [id number] - Age: 23year old - :  1995    Date of Admission: 4/15/2018  7:33 AM  Admission Diagnosis: Type 1 diabetes mellitus with ketoacidos SYSTEMS:   Reviewed and negative except per HPI    OBJECTIVE:   04/15/18  0920 04/15/18  0950 04/15/18  1000 04/15/18  1010   BP: 109/52 104/59 108/56 110/55   Pulse: 90 95 91 95   Resp: 14 23 21 19   Temp:       TempSrc:       SpO2: 100% 100% 100% 100% 128*  130*   K  4.1  4.3   CL  97*  99*   CO2  20.0*  15.0*   BUN  17  18       Creatinine (mg/dL)   Date Value   04/15/2018 0.85   04/13/2018 0.89   03/31/2017 0.73   ----------]    Recent Labs   Lab  04/13/18   2257  04/15/18   0747   ALT  17  17   AST

## 2018-04-15 NOTE — H&P
[facility]  History and Physical     Ezequiel Jaeger Patient Status:  Emergency    1995 MRN [id number]   Location [facility] [facility] Attending Ritika Prabhakar MD   Hosp Day # 0 PCP GREGG LOZA Needle 32G X 4 MM Does not apply Misc 3 times daily with humalog Disp: 100 each Rfl: 0   Insulin Syringe 31G X 5/16\" 1 ML Does not apply Misc 2 times daily with lantus Disp: 100 each Rfl: 0   ONETOUCH DELICA LANCETS FINE Does not apply Misc 4 times daily mL/min (based on SCr of 0.85 mg/dL). No results for input(s): PTP, INR in the last 72 hours. No results for input(s): TROP, CK in the last 72 hours. Imaging: Imaging data reviewed in Epic. ASSESSMENT / PLAN:     1. Abd Pain  1.  Likely due to

## 2018-04-16 VITALS
HEIGHT: 65 IN | SYSTOLIC BLOOD PRESSURE: 115 MMHG | TEMPERATURE: 98 F | OXYGEN SATURATION: 100 % | HEART RATE: 88 BPM | WEIGHT: 160 LBS | RESPIRATION RATE: 18 BRPM | DIASTOLIC BLOOD PRESSURE: 74 MMHG | BODY MASS INDEX: 26.66 KG/M2

## 2018-04-16 PROCEDURE — 99233 SBSQ HOSP IP/OBS HIGH 50: CPT | Performed by: CLINICAL NURSE SPECIALIST

## 2018-04-16 PROCEDURE — 99239 HOSP IP/OBS DSCHRG MGMT >30: CPT | Performed by: INTERNAL MEDICINE

## 2018-04-16 RX ORDER — BLOOD-GLUCOSE METER
1 EACH MISCELLANEOUS
Qty: 1 KIT | Refills: 0 | Status: SHIPPED | OUTPATIENT
Start: 2018-04-16

## 2018-04-16 RX ORDER — MAGNESIUM OXIDE 400 MG (241.3 MG MAGNESIUM) TABLET
400 TABLET ONCE
Status: COMPLETED | OUTPATIENT
Start: 2018-04-16 | End: 2018-04-16

## 2018-04-16 RX ORDER — BLOOD SUGAR DIAGNOSTIC
1 STRIP MISCELLANEOUS
Qty: 50 STRIP | Refills: 3 | Status: SHIPPED | OUTPATIENT
Start: 2018-04-16

## 2018-04-16 RX ORDER — LANCETS
EACH MISCELLANEOUS
Qty: 50 EACH | Refills: 3 | Status: SHIPPED | OUTPATIENT
Start: 2018-04-16

## 2018-04-16 NOTE — PROGRESS NOTES
Pt received this AM, A&OX4, VSS, denies pain. Tolerating ADA diet, insulin coverage for accu check and prandial.  Pt up independently, voiding freely. All MD at bedside, ok to d/c home. Babak Caceres at  for DM education.   Pt given all d/c instruction,

## 2018-04-16 NOTE — PAYOR COMM NOTE
--------------  ADMISSION REVIEW     Payor: Estelita Morteza #:  [id number]  Authorization Number: N/A    Admit date: 4/15/18  Admit time: 3219       Admitting Physician: Marisela Vega MD  Attending Physician:  Dong Benoit MD  Primary Care Physicia the following:     Glucose 497 (*)     AST 14 (*)     Total Protein 8.6 (*)     Sodium 130 (*)     Chloride 99 (*)     CO2 15.0 (*)     All other components within normal limits   PHOSPHORUS - Normal   MAGNESIUM - Normal   LIPASE - Normal   POCT PREGNANCY, Units Subcutaneous (Left Lower Abdomen) Lesa Bone RN      Insulin Aspart Pen (NOVOLOG) 100 UNIT/ML flexpen 1-68 Units     Date Action Dose Route User    4/16/2018 1120 Given 5 Units Subcutaneous (Left Upper Arm) Tony Soto RN      Insulin Asp process. - IVFs  - insulin gtt  - monitor labs, replete lytes as needed. - check rvp    Diabetic ketoacidosis. Unclear etiology as no infectious source is a parent. Question compliance with her regimen.   Patient states that she is compliant, however,

## 2018-04-16 NOTE — CM/SW NOTE
04/16/18 1300   CM/SW Referral Data   Referral Source Physician   Reason for Referral Discharge planning   Informant Patient   Social History   Recreational Drug/Alcohol Use no   Major Changes Last 6 Months no   Domestic/Partner Violence no   Suicidal I

## 2018-04-16 NOTE — PROGRESS NOTES
[address] Patient Status:  Inpatient    1995 MRN [id number]   [facility] 4SW-A Attending Davie Ambriz MD   Hosp Day # 1 PCP GREGG LZOA     Critical Care Progress Note      Assessment/Plan:  1.  Dka 0. 84  0.64   GFRAA  108  114   < >  112  116  149   GFRNAA  94  99   < >  98  100  129   CA  9.1  9.5   < >  8.1*  8.5  8.1*   ALB  3.6  3.8   --    --    --    --    NA  128*  130*   < >  135*  138  137   K  4.1  4.3   < >  4.3  3.8  3.9   CL  97*  99*

## 2018-04-16 NOTE — CONSULTS
[facility]    PATIENT'S NAME: Clair Manuel ANAMARIA HEATHER   ATTENDING PHYSICIAN: BECKI Clarke Ro: Obdulia Amato M.D.    PATIENT ACCOUNT#:   [de-identified]    LOCATION:  [address] [place] [address]  MEDICAL RECORD #:   [id number]       DATE atraumatic. NECK:  Supple. LUNGS:  Clear bilaterally. HEART:  Regular rate and rhythm. ABDOMEN:  Soft, nontender. EXTREMITIES:  No cyanosis, clubbing, or edema. NEUROLOGIC:  Awake, alert, moving 4 extremities, moving all extremities equally.   SKIN:

## 2018-04-16 NOTE — DISCHARGE SUMMARY
[facility] HOSPITALIST  DISCHARGE SUMMARY     [address] Patient Status:  Inpatient    1995 MRN [id number]   [facility] 4SW-A Attending Jan Moise MD   Hosp Day # 1 PCP GREGG LOZA     Date of Admission descriptions):  • none    Lab/Test results pending at Discharge:   · none    Consultants:  • Endocrine, CC    Discharge Medication List:     Discharge Medications      CONTINUE taking these medications      Instructions Prescription details   Glucose Blood

## 2018-04-16 NOTE — PLAN OF CARE
METABOLIC/FLUID AND ELECTROLYTES - ADULT    • Glucose maintained within prescribed range Progressing    • Electrolytes maintained within normal limits Progressing    • Hemodynamic stability and optimal renal function maintained Progressing        Patient r

## 2018-04-16 NOTE — CONSULTS
[facility]  Diabetes Consult Note    [address] Patient Status:  Inpatient    1995 MRN [id number]   [facility] 4SW-A Attending Yola Abarca MD   Hosp Day # 1 PCP GREGG LOZA     Reason for Consu 8.1*  --  8.5  --  8.1*  --   --    ALB 3.6  --  3.8  --   --   --   --   --   --   --   --   --   --    < > = values in this interval not displayed.     History of Present Illness: Katy Baron is a 23year old female with type 1 diabetes verbalized understanding. She does count carbohydrates and insisted she takes her insulin.   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

## 2018-04-17 NOTE — PAYOR COMM NOTE
--------------  DISCHARGE REVIEW    Payor: Jt Membreno #:  [id number]  Authorization Number: N/A    Admit date: 4/15/18  Admit time:  0945  Discharge Date: 4/16/2018  2:05 PM     Admitting Physician: Michel Charlton MD  Attending Physician:  Jeanna att.  p

## 2018-04-19 NOTE — ED PROVIDER NOTES
Patient Seen in: [facility] Emergency Department    History   Patient presents with:  Abdomen/Flank Pain (GI/)    Stated Complaint: abd pain diabetic    HPI    51-year-old female presents emergency department states that she has been having some p lymphadenopathy no meningismus no carotid bruit  CV: Regular rate and rhythm no murmur rub  Respiratory: Clear to auscultation bilaterally no crackles no wheezes no accessory muscle use  Abdomen: Soft nontender nondistended, no rebound no guarding  no hepa Patient had significantly elevated glucose. Did give 2 L of fluid along with insulin boluses. Sugar came down appropriately. Told the patient she definitely needs to manage her sugars much better. Would like her to follow-up with endocrine.   She ag

## 2020-01-09 ENCOUNTER — HOSPITAL ENCOUNTER (INPATIENT)
Facility: HOSPITAL | Age: 25
LOS: 1 days | Discharge: HOME | DRG: 639 | End: 2020-01-10
Attending: EMERGENCY MEDICINE | Admitting: INTERNAL MEDICINE
Payer: MEDICAID

## 2020-01-09 DIAGNOSIS — E10.10 TYPE 1 DIABETES MELLITUS WITH KETOACIDOSIS WITHOUT COMA (HCC): Primary | ICD-10-CM

## 2020-01-09 LAB
ANION GAP SERPL CALC-SCNC: 21 MMOL/L (ref 0–18)
ANION GAP SERPL CALC-SCNC: 8 MMOL/L (ref 0–18)
B-HCG UR QL: NEGATIVE
BASOPHILS # BLD AUTO: 0.08 X10(3) UL (ref 0–0.2)
BASOPHILS NFR BLD AUTO: 0.8 %
BILIRUB UR QL: NEGATIVE
BUN BLD-MCNC: 12 MG/DL (ref 7–18)
BUN BLD-MCNC: 8 MG/DL (ref 7–18)
BUN/CREAT SERPL: 10.3 (ref 10–20)
BUN/CREAT SERPL: 13.3 (ref 10–20)
CALCIUM BLD-MCNC: 10 MG/DL (ref 8.5–10.1)
CALCIUM BLD-MCNC: 8.3 MG/DL (ref 8.5–10.1)
CHLORIDE SERPL-SCNC: 108 MMOL/L (ref 98–112)
CHLORIDE SERPL-SCNC: 95 MMOL/L (ref 98–112)
CLARITY UR: CLEAR
CO2 SERPL-SCNC: 16 MMOL/L (ref 21–32)
CO2 SERPL-SCNC: 21 MMOL/L (ref 21–32)
COLOR UR: COLORLESS
CREAT BLD-MCNC: 0.6 MG/DL (ref 0.55–1.02)
CREAT BLD-MCNC: 1.17 MG/DL (ref 0.55–1.02)
DEPRECATED RDW RBC AUTO: 39.4 FL (ref 35.1–46.3)
EOSINOPHIL # BLD AUTO: 0.02 X10(3) UL (ref 0–0.7)
EOSINOPHIL NFR BLD AUTO: 0.2 %
ERYTHROCYTE [DISTWIDTH] IN BLOOD BY AUTOMATED COUNT: 12.8 % (ref 11–15)
EST. AVERAGE GLUCOSE BLD GHB EST-MCNC: 372 MG/DL (ref 68–126)
GLUCOSE BLD-MCNC: 498 MG/DL (ref 70–99)
GLUCOSE BLD-MCNC: 97 MG/DL (ref 70–99)
GLUCOSE BLDC GLUCOMTR-MCNC: 117 MG/DL (ref 70–99)
GLUCOSE BLDC GLUCOMTR-MCNC: 132 MG/DL (ref 70–99)
GLUCOSE BLDC GLUCOMTR-MCNC: 155 MG/DL (ref 70–99)
GLUCOSE BLDC GLUCOMTR-MCNC: 231 MG/DL (ref 70–99)
GLUCOSE BLDC GLUCOMTR-MCNC: 246 MG/DL (ref 70–99)
GLUCOSE BLDC GLUCOMTR-MCNC: 270 MG/DL (ref 70–99)
GLUCOSE BLDC GLUCOMTR-MCNC: 313 MG/DL (ref 70–99)
GLUCOSE BLDC GLUCOMTR-MCNC: 347 MG/DL (ref 70–99)
GLUCOSE BLDC GLUCOMTR-MCNC: 352 MG/DL (ref 70–99)
GLUCOSE BLDC GLUCOMTR-MCNC: 464 MG/DL (ref 70–99)
GLUCOSE BLDC GLUCOMTR-MCNC: 49 MG/DL (ref 70–99)
GLUCOSE BLDC GLUCOMTR-MCNC: 54 MG/DL (ref 70–99)
GLUCOSE BLDC GLUCOMTR-MCNC: 76 MG/DL (ref 70–99)
GLUCOSE BLDC GLUCOMTR-MCNC: 77 MG/DL (ref 70–99)
GLUCOSE BLDC GLUCOMTR-MCNC: 97 MG/DL (ref 70–99)
GLUCOSE BLDC GLUCOMTR-MCNC: 98 MG/DL (ref 70–99)
GLUCOSE UR-MCNC: >=500 MG/DL
HBA1C MFR BLD HPLC: 14.6 % (ref ?–5.7)
HCT VFR BLD AUTO: 41.7 % (ref 35–48)
HGB BLD-MCNC: 13.3 G/DL (ref 12–16)
HGB UR QL STRIP.AUTO: NEGATIVE
IMM GRANULOCYTES # BLD AUTO: 0.04 X10(3) UL (ref 0–1)
IMM GRANULOCYTES NFR BLD: 0.4 %
KETONES UR-MCNC: 80 MG/DL
LEUKOCYTE ESTERASE UR QL STRIP.AUTO: NEGATIVE
LYMPHOCYTES # BLD AUTO: 1.49 X10(3) UL (ref 1–4)
LYMPHOCYTES NFR BLD AUTO: 14.6 %
MCH RBC QN AUTO: 27 PG (ref 26–34)
MCHC RBC AUTO-ENTMCNC: 31.9 G/DL (ref 31–37)
MCV RBC AUTO: 84.8 FL (ref 80–100)
MONOCYTES # BLD AUTO: 0.66 X10(3) UL (ref 0.1–1)
MONOCYTES NFR BLD AUTO: 6.4 %
MRSA DNA SPEC QL NAA+PROBE: NEGATIVE
NEUTROPHILS # BLD AUTO: 7.95 X10 (3) UL (ref 1.5–7.7)
NEUTROPHILS # BLD AUTO: 7.95 X10(3) UL (ref 1.5–7.7)
NEUTROPHILS NFR BLD AUTO: 77.6 %
NITRITE UR QL STRIP.AUTO: NEGATIVE
OSMOLALITY SERPL CALC.SUM OF ELEC: 282 MOSM/KG (ref 275–295)
OSMOLALITY SERPL CALC.SUM OF ELEC: 296 MOSM/KG (ref 275–295)
PH UR: 5 [PH] (ref 5–8)
PLATELET # BLD AUTO: 309 10(3)UL (ref 150–450)
POTASSIUM SERPL-SCNC: 4.2 MMOL/L (ref 3.5–5.1)
POTASSIUM SERPL-SCNC: 4.6 MMOL/L (ref 3.5–5.1)
PROT UR-MCNC: NEGATIVE MG/DL
RBC # BLD AUTO: 4.92 X10(6)UL (ref 3.8–5.3)
SODIUM SERPL-SCNC: 132 MMOL/L (ref 136–145)
SODIUM SERPL-SCNC: 137 MMOL/L (ref 136–145)
SP GR UR STRIP: 1.02 (ref 1–1.03)
UROBILINOGEN UR STRIP-ACNC: <2
WBC # BLD AUTO: 10.2 X10(3) UL (ref 4–11)

## 2020-01-09 PROCEDURE — 99254 IP/OBS CNSLTJ NEW/EST MOD 60: CPT | Performed by: INTERNAL MEDICINE

## 2020-01-09 RX ORDER — ONDANSETRON 2 MG/ML
4 INJECTION INTRAMUSCULAR; INTRAVENOUS ONCE
Status: COMPLETED | OUTPATIENT
Start: 2020-01-09 | End: 2020-01-09

## 2020-01-09 RX ORDER — ACETAMINOPHEN 325 MG/1
650 TABLET ORAL EVERY 6 HOURS PRN
Status: DISCONTINUED | OUTPATIENT
Start: 2020-01-09 | End: 2020-01-10

## 2020-01-09 RX ORDER — ECHINACEA PURPUREA EXTRACT 125 MG
1 TABLET ORAL
Status: DISCONTINUED | OUTPATIENT
Start: 2020-01-09 | End: 2020-01-10

## 2020-01-09 RX ORDER — DEXTROSE MONOHYDRATE 25 G/50ML
50 INJECTION, SOLUTION INTRAVENOUS AS NEEDED
Status: DISCONTINUED | OUTPATIENT
Start: 2020-01-09 | End: 2020-01-10

## 2020-01-09 RX ORDER — SODIUM CHLORIDE 9 MG/ML
INJECTION, SOLUTION INTRAVENOUS CONTINUOUS
Status: DISCONTINUED | OUTPATIENT
Start: 2020-01-09 | End: 2020-01-09

## 2020-01-09 RX ORDER — DEXTROSE MONOHYDRATE 25 G/50ML
50 INJECTION, SOLUTION INTRAVENOUS AS NEEDED
Status: DISCONTINUED | OUTPATIENT
Start: 2020-01-09 | End: 2020-01-09

## 2020-01-09 RX ORDER — DEXTROSE AND SODIUM CHLORIDE 5; .45 G/100ML; G/100ML
INJECTION, SOLUTION INTRAVENOUS CONTINUOUS
Status: DISCONTINUED | OUTPATIENT
Start: 2020-01-09 | End: 2020-01-09

## 2020-01-09 RX ORDER — ONDANSETRON 2 MG/ML
4 INJECTION INTRAMUSCULAR; INTRAVENOUS EVERY 6 HOURS PRN
Status: DISCONTINUED | OUTPATIENT
Start: 2020-01-09 | End: 2020-01-10

## 2020-01-09 RX ORDER — INSULIN LISPRO 100 [IU]/ML
15 INJECTION, SOLUTION INTRAVENOUS; SUBCUTANEOUS NIGHTLY
COMMUNITY

## 2020-01-09 NOTE — CM/SW NOTE
Pt's insurance is [employer]. Patient is being admitted INPT to [facility] for Type 1 diabetes mellitus with ketoacidosis without coma ([facility] [address]

## 2020-01-09 NOTE — H&P
[facility]    History and [address] Patient Status:  Inpatient    1995 MRN [id number]   Location [facility] 2W/SW Attending Noah Barrett MD   Hosp Day # 0 PCP GREGG LOZA     Date:  20 meals and for signs, symptoms of hypoglycemia. Blood Glucose Monitoring Suppl (ACCU-CHEK GUIDE) w/Device Does not apply Kit, 1 kit by Does not apply route 3 (three) times daily before meals.   Insulin Pen Needle 32G X 4 MM Does not apply Misc, 3 times anya ALT 17 04/15/2018     04/15/2018    MG 1.6 (L) 04/16/2018    PHOS 3.9 04/15/2018               Assessment/Plan:     Type 1 diabetes mellitus with ketoacidosis on insulin drip  Anxiety and depression on meds  HLD on statin  Typ1 DM            [id number]

## 2020-01-09 NOTE — CONSULTS
[facility]    Report of Consultation    Nohemi Ferrell Patient Status:  Inpatient    1995 MRN [id number]   Location [facility] 2W/SW Attending Harvey Edge MD   Hosp Day # 0 PCP GREGG HIGGINS-ALIS     Date of A Intravenous, PRN  •  Glucose-Vitamin C (DEX-4) chewable tab 4 tablet, 4 tablet, Oral, Q15 Min PRN  •  glucose (DEX4) oral liquid 15 g, 15 g, Oral, Q15 Min PRN  •  acetaminophen (TYLENOL) tab 650 mg, 650 mg, Oral, Q6H PRN  •  [facility]ansetron [facility]FRAN) [facility]  1/9/2020  8:59 AM

## 2020-01-09 NOTE — ED NOTES
Assumed care to this pt who came in ambulatory with steady gait to room 38 from triage for complaints of nausea and vomiting x 7 since 9 PM last night. Pt has history of DM. Pt A/O x 4, breathing is even and non labored. Pt changed  to hospital gown.   Saul Curiel

## 2020-01-09 NOTE — ED PROVIDER NOTES
Patient Seen in: [facility] AND [facility] Emergency Department      History   Patient presents with:  Vomiting    Stated Complaint: n/v    HPI    29-year-old female with past medical history significant for anxiety, depression, high cholesterol, type 1 diabetes of motion. Neck supple. No tracheal deviation present. CV: s1s2+, RRR, no m/r/g, normal distal pulses  Pulmonary/Chest: CTA b/l with no rales, wheezes. No chest wall tenderness  Abdominal: Nontender. Nondistended. Soft.  Bowel sounds are normal.   Back: Please view results for these tests on the individual orders. BETA HYDROXYBUTYRATE   RAINBOW DRAW BLUE   RAINBOW DRAW LAVENDER   RAINBOW DRAW LIGHT GREEN   RAINBOW DRAW GOLD                  MDM     Patient does have DKA at this time.   Her initial gl

## 2020-01-09 NOTE — PLAN OF CARE
Up to bathroom, independent after set-up. Transitioned off insulin drip to subcutaneous insulin. Appetite good. AC&HS blood sugar checks. Diabetes education provided by nursing staff and diabetes educator.  Monitoring blood sugar checks in PCU to make sure of acute illness to prevent rehospitalization and improve understanding of sick day management/diabetes education.    - See additional Care Plan goals for specific interventions  Outcome: Progressing  Goal: Patient/Family Short Term Goal  Description  Heydi

## 2020-01-10 VITALS
OXYGEN SATURATION: 100 % | DIASTOLIC BLOOD PRESSURE: 69 MMHG | BODY MASS INDEX: 22.02 KG/M2 | RESPIRATION RATE: 19 BRPM | HEIGHT: 64 IN | SYSTOLIC BLOOD PRESSURE: 102 MMHG | HEART RATE: 97 BPM | WEIGHT: 129 LBS | TEMPERATURE: 98 F

## 2020-01-10 LAB
ANION GAP SERPL CALC-SCNC: 4 MMOL/L (ref 0–18)
BUN BLD-MCNC: 12 MG/DL (ref 7–18)
BUN/CREAT SERPL: 17.6 (ref 10–20)
CALCIUM BLD-MCNC: 8.4 MG/DL (ref 8.5–10.1)
CHLORIDE SERPL-SCNC: 108 MMOL/L (ref 98–112)
CO2 SERPL-SCNC: 27 MMOL/L (ref 21–32)
CREAT BLD-MCNC: 0.68 MG/DL (ref 0.55–1.02)
DEPRECATED RDW RBC AUTO: 40.1 FL (ref 35.1–46.3)
ERYTHROCYTE [DISTWIDTH] IN BLOOD BY AUTOMATED COUNT: 12.9 % (ref 11–15)
GLUCOSE BLD-MCNC: 123 MG/DL (ref 70–99)
GLUCOSE BLDC GLUCOMTR-MCNC: 246 MG/DL (ref 70–99)
GLUCOSE BLDC GLUCOMTR-MCNC: 60 MG/DL (ref 70–99)
GLUCOSE BLDC GLUCOMTR-MCNC: 88 MG/DL (ref 70–99)
HCT VFR BLD AUTO: 33.3 % (ref 35–48)
HGB BLD-MCNC: 10.7 G/DL (ref 12–16)
MCH RBC QN AUTO: 27 PG (ref 26–34)
MCHC RBC AUTO-ENTMCNC: 32.1 G/DL (ref 31–37)
MCV RBC AUTO: 84.1 FL (ref 80–100)
OSMOLALITY SERPL CALC.SUM OF ELEC: 289 MOSM/KG (ref 275–295)
PLATELET # BLD AUTO: 234 10(3)UL (ref 150–450)
POTASSIUM SERPL-SCNC: 3.5 MMOL/L (ref 3.5–5.1)
RBC # BLD AUTO: 3.96 X10(6)UL (ref 3.8–5.3)
SODIUM SERPL-SCNC: 139 MMOL/L (ref 136–145)
WBC # BLD AUTO: 5.3 X10(3) UL (ref 4–11)

## 2020-01-10 PROCEDURE — 99232 SBSQ HOSP IP/OBS MODERATE 35: CPT | Performed by: INTERNAL MEDICINE

## 2020-01-10 RX ORDER — POTASSIUM CHLORIDE 20 MEQ/1
40 TABLET, EXTENDED RELEASE ORAL EVERY 4 HOURS
Status: COMPLETED | OUTPATIENT
Start: 2020-01-10 | End: 2020-01-10

## 2020-01-10 NOTE — DISCHARGE SUMMARY
[facility] - [facility]    Discharge Summary    Peace Cagle Patient Status:  Inpatient    1995 MRN [id number]   Location [facility] 2W/SW Attending Francis Chun MD   [address] Day # 1 PCP GREGG LOZA     Date of Admissio Kit  1 kit by Does not apply route 3 (three) times daily before meals.     Insulin Pen Needle 32G X 4 MM Does not apply Misc  3 times daily with humalog    Insulin Syringe 31G X 5/16\" 1 ML Does not apply Misc  2 times daily with lantus              Dischar

## 2020-01-10 NOTE — PAYOR COMM NOTE
--------------  ADMISSION REVIEW     Payor: EWELINA [address] #:  [id number]  Authorization Number: [id number]    Admit date: 1/9/20  Admit time: 6169       Admitting Physician: Víctor Thompson MD  Attending Physician:  Víctor Thompson MD  Primary C above.    Physical Exam     ED Triage Vitals [01/09/20 0312]   /75   Pulse (!) 122   Resp 22   Temp 98.2 °F (36.8 °C)   Temp src Oral   SpO2 99 %   O2 Device None (Room air)       Current:/69   Pulse 109   Temp 98.2 °F (36.8 °C) (Oral)   Resp 2 DIFFERENTIAL - Abnormal; Notable for the following components:    Neutrophil Absolute Prelim 7.95 (*)     Neutrophil Absolute 7.95 (*)     All other components within normal limits   EM POCT PREGNANCY URINE - Normal   CBC WITH DIFFERENTIAL WITH PLATELET Reviewed: 6/20/2017          ICD-10-CM Noted POA    Type 1 diabetes mellitus with ketoacidosis without coma ([facility] E10.10 4/15/2018 Unknown                   Signed by Guicho Novak MD on 1/9/2020  5:13 AM            H&P - H&P Note      H&P signed by Mya Porter Basaglar   Insulin Lispro 100 UNIT/ML Subcutaneous Solution, Inject 15 Units into the skin nightly. Insulin Lispro (HUMALOG) 100 UNIT/ML Subcutaneous Solution Cartridge, Inject 20 Units into the skin 3 (three) times daily before meals.  (Patient taking has a normal mood and affect.      Cervical Papanicolaou contraindicated    Results:     Lab Results   Component Value Date    WBC 10.2 01/09/2020    HGB 13.3 01/09/2020    HCT 41.7 01/09/2020    .0 01/09/2020    CREATSERUM 0.60 01/09/2020    BUN 8 0 M20) CR tab 40 mEq     Date Action Dose Route User    1/10/2020 0842 Given 40 mEq Oral Cherylle Halima RN      Saline Nasal Spray (SALINE MIST) 1 spray     Date Action Dose Route User    1/9/2020 2200 Given 1 spray Each Cliffe Rober Wilcox RN          1/9 mellitus ([facility]     Epigastric pain     Diabetic ketoacidosis without coma associated with diabetes mellitus due to underlying condition ([facility]     Uncontrolled type 1 diabetes mellitus ([facility]     Type 1 diabetes mellitus with ketoacidosis without coma ([facility]

## 2020-01-10 NOTE — DIABETES ED
[facility]   Diabetes Education Note    Akshat Hurtado  Patient Status Inpatient    1995  Location [facility] 2W/SW  Attending Vinicio Oneal MD  Hospital Day # 1  PCP  GREGG LOZA      Reason for Visit:  Kennedy Oates

## 2020-01-10 NOTE — PROGRESS NOTES
[place] [facility] - [facility]    Progress Note    Francisco Hayes Patient Status:  Inpatient    1995 MRN [id number]   Location [facility] 2W/SW Attending Javan Nathan MD   [address] Day # 1 PCP GREGG LOZA     Subjective:  Voncille Mary Hudec  1/10/2020  7:51 AM

## 2020-01-10 NOTE — PLAN OF CARE
Problem: Patient Centered Care  Goal: Patient preferences are identified and integrated in the patient's plan of care  Description  Interventions:  - What would you like us to know as we care for you? I have had type 1 diabetes since I was [age] years old. have relief of my nausea and headache. Interventions:   - PRN Tylenol & Zofran orders obtained, see eMAR. - Trending labs and assessing readiness to transition off insulin drip to subcutaneous dosing per endocrinology.  Diet recommendations per endocrin

## 2020-01-12 ENCOUNTER — TELEPHONE (OUTPATIENT)
Dept: MEDSURG UNIT | Facility: HOSPITAL | Age: 25
End: 2020-01-12

## 2020-01-13 ENCOUNTER — TELEPHONE (OUTPATIENT)
Dept: MEDSURG UNIT | Facility: HOSPITAL | Age: 25
End: 2020-01-13

## 2020-01-13 LAB — BETA-HYDROXYBUTYRIC ACID: 70.4 MG/DL

## 2020-01-13 NOTE — PAYOR COMM NOTE
--------------  DISCHARGE REVIEW    Payor: Juan Tineo #:  [id number]  Authorization Number: [id number]    Admit date: 1/9/20  Admit time:  3158  Discharge Date: 1/10/2020  1:06 PM     Admitting Physician: Víctor Thompson MD  Attending Physician: UNIT/ML Subcutaneous Solution Pen-injector  Inject 30 Units into the skin. Every morning and at night     Basaglar     Insulin Lispro 100 UNIT/ML Subcutaneous Solution  Inject 15 Units into the skin nightly.     Insulin Lispro (HUMALOG) 100 UNIT/ML Subcutan

## 2020-06-25 ENCOUNTER — HOSPITAL ENCOUNTER (EMERGENCY)
Facility: HOSPITAL | Age: 25
Discharge: HOME | End: 2020-06-25
Attending: EMERGENCY MEDICINE
Payer: MEDICAID

## 2020-06-25 VITALS
RESPIRATION RATE: 18 BRPM | BODY MASS INDEX: 22.02 KG/M2 | HEIGHT: 64 IN | HEART RATE: 99 BPM | OXYGEN SATURATION: 99 % | DIASTOLIC BLOOD PRESSURE: 91 MMHG | TEMPERATURE: 99 F | SYSTOLIC BLOOD PRESSURE: 124 MMHG | WEIGHT: 129 LBS

## 2020-06-25 DIAGNOSIS — Z51.89 VISIT FOR WOUND CHECK: ICD-10-CM

## 2020-06-25 DIAGNOSIS — S64.40XA INJURY OF DIGITAL NERVE OF FINGER, INITIAL ENCOUNTER: Primary | ICD-10-CM

## 2020-06-25 PROCEDURE — 99281 EMR DPT VST MAYX REQ PHY/QHP: CPT

## 2020-06-25 PROCEDURE — 99282 EMERGENCY DEPT VISIT SF MDM: CPT
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
## 2020-06-25 NOTE — ED PROVIDER NOTES
Patient Seen in: [facility] Emergency Department      History   Patient presents with:  Swelling Edema    Stated Complaint: swelling in finger - sutures placed Monday - continued pain    HPI    Is a pleasant 26-year-old female who presents to the ER 4\")   Wt 58.5 kg   LMP 06/20/2020   SpO2 99%   BMI 22.14 kg/m²         Physical Exam  General: Nontoxic well-appearing 22-year-old female in no distress.   Focused exam on the patient's right hand: The patient has 3 sutures that are placed on the palmar zhang diagnosis)  Visit for wound check    Disposition:  Discharge  6/25/2020  9:46 am    Follow-up:  Natasha Layne  [address] [facility] [address]  [phone]    Schedule an appointment as soon as possible for a visit in 2 days

## 2020-07-14 ENCOUNTER — HOSPITAL ENCOUNTER (EMERGENCY)
Facility: HOSPITAL | Age: 25
Discharge: HOME | End: 2020-07-14
Attending: EMERGENCY MEDICINE
Payer: MEDICAID

## 2020-07-14 VITALS
TEMPERATURE: 98 F | WEIGHT: 135 LBS | BODY MASS INDEX: 23.92 KG/M2 | SYSTOLIC BLOOD PRESSURE: 108 MMHG | DIASTOLIC BLOOD PRESSURE: 78 MMHG | HEIGHT: 63 IN | OXYGEN SATURATION: 98 % | RESPIRATION RATE: 18 BRPM | HEART RATE: 96 BPM

## 2020-07-14 DIAGNOSIS — L02.419 AXILLARY ABSCESS: Primary | ICD-10-CM

## 2020-07-14 LAB
POCT LOT NUMBER: NORMAL
POCT URINE PREGNANCY: NEGATIVE

## 2020-07-14 PROCEDURE — 10060 I&D ABSCESS SIMPLE/SINGLE: CPT

## 2020-07-14 PROCEDURE — 99283 EMERGENCY DEPT VISIT LOW MDM: CPT

## 2020-07-14 PROCEDURE — 81025 URINE PREGNANCY TEST: CPT

## 2020-07-14 RX ORDER — CLINDAMYCIN HYDROCHLORIDE 150 MG/1
300 CAPSULE ORAL ONCE
Status: COMPLETED | OUTPATIENT
Start: 2020-07-14 | End: 2020-07-14

## 2020-07-14 RX ORDER — CLINDAMYCIN HYDROCHLORIDE 300 MG/1
300 CAPSULE ORAL 4 TIMES DAILY
Qty: 28 CAPSULE | Refills: 0 | Status: SHIPPED | OUTPATIENT
Start: 2020-07-14 | End: 2020-07-21

## 2020-07-14 NOTE — ED NOTES
Area of abscess cleansed with water/gauze, 4x4 placed with large bandaid to axilla as directed by MD. Pt aware not to use deodorant and shave axilla while area healing.

## 2020-07-14 NOTE — ED PROVIDER NOTES
Patient Seen in: [facility] Emergency Department      History   Patient presents with:  Abscess    Stated Complaint: painful lump under right armpit x3-4 weeks    HPI    26-year-old female presents today with a right axillary abscess.   She has no hi Cardiovascular:      Rate and Rhythm: Normal rate and regular rhythm. Pulmonary:      Effort: Pulmonary effort is normal.      Breath sounds: Normal breath sounds. Abdominal:      Palpations: Abdomen is soft. Tenderness: There is no tenderness. file for this visit. Follow-up:  No follow-up provider specified.         Medications Prescribed:  Current Discharge Medication List

## 2020-07-14 NOTE — ED INITIAL ASSESSMENT (HPI)
Pt presents to the ED with complaints of lump to right armpit for past 2 weeks, became painful over past couple of days. Pt has not had an issue with abscesses in past. Pt is type 1 DM with pump, sugars controlled.  Pt awake and alert, skin w/d,resps reg/un

## 2020-12-31 ENCOUNTER — HOSPITAL ENCOUNTER (EMERGENCY)
Facility: HOSPITAL | Age: 25
Discharge: HOME | End: 2020-12-31
Attending: EMERGENCY MEDICINE
Payer: MEDICAID

## 2020-12-31 ENCOUNTER — APPOINTMENT (OUTPATIENT)
Dept: GENERAL RADIOLOGY | Facility: HOSPITAL | Age: 25
End: 2020-12-31
Attending: EMERGENCY MEDICINE
Payer: MEDICAID

## 2020-12-31 ENCOUNTER — APPOINTMENT (OUTPATIENT)
Dept: CT IMAGING | Facility: HOSPITAL | Age: 25
End: 2020-12-31
Attending: EMERGENCY MEDICINE
Payer: MEDICAID

## 2020-12-31 VITALS
RESPIRATION RATE: 18 BRPM | WEIGHT: 163 LBS | DIASTOLIC BLOOD PRESSURE: 66 MMHG | HEIGHT: 64 IN | BODY MASS INDEX: 27.83 KG/M2 | HEART RATE: 98 BPM | TEMPERATURE: 99 F | OXYGEN SATURATION: 99 % | SYSTOLIC BLOOD PRESSURE: 112 MMHG

## 2020-12-31 DIAGNOSIS — N12 PYELONEPHRITIS: Primary | ICD-10-CM

## 2020-12-31 LAB
ANION GAP SERPL CALC-SCNC: 7 MMOL/L (ref 0–18)
B-HCG UR QL: NEGATIVE
BASE EXCESS BLD CALC-SCNC: 1.7 MMOL/L (ref ?–2)
BASOPHILS # BLD AUTO: 0.03 X10(3) UL (ref 0–0.2)
BASOPHILS NFR BLD AUTO: 0.2 %
BILIRUB UR QL: NEGATIVE
BUN BLD-MCNC: 7 MG/DL (ref 7–18)
BUN/CREAT SERPL: 7.4 (ref 10–20)
CALCIUM BLD-MCNC: 9.4 MG/DL (ref 8.5–10.1)
CHLORIDE SERPL-SCNC: 103 MMOL/L (ref 98–112)
CO2 SERPL-SCNC: 26 MMOL/L (ref 21–32)
COLOR UR: YELLOW
CREAT BLD-MCNC: 0.94 MG/DL
DEPRECATED RDW RBC AUTO: 39.4 FL (ref 35.1–46.3)
EOSINOPHIL # BLD AUTO: 0.08 X10(3) UL (ref 0–0.7)
EOSINOPHIL NFR BLD AUTO: 0.6 %
ERYTHROCYTE [DISTWIDTH] IN BLOOD BY AUTOMATED COUNT: 12.7 % (ref 11–15)
GLUCOSE BLD-MCNC: 271 MG/DL (ref 70–99)
GLUCOSE BLDC GLUCOMTR-MCNC: 191 MG/DL (ref 70–99)
GLUCOSE BLDC GLUCOMTR-MCNC: 257 MG/DL (ref 70–99)
GLUCOSE UR-MCNC: >=500 MG/DL
HCO3 BLDV-SCNC: 24.8 MEQ/L (ref 22–26)
HCT VFR BLD AUTO: 33.4 %
HGB BLD-MCNC: 10.6 G/DL
HGB UR QL STRIP.AUTO: NEGATIVE
IMM GRANULOCYTES # BLD AUTO: 0.05 X10(3) UL (ref 0–1)
IMM GRANULOCYTES NFR BLD: 0.4 %
KETONES UR-MCNC: 20 MG/DL
LYMPHOCYTES # BLD AUTO: 0.79 X10(3) UL (ref 1–4)
LYMPHOCYTES NFR BLD AUTO: 6 %
MCH RBC QN AUTO: 26.9 PG (ref 26–34)
MCHC RBC AUTO-ENTMCNC: 31.7 G/DL (ref 31–37)
MCV RBC AUTO: 84.8 FL
MONOCYTES # BLD AUTO: 0.81 X10(3) UL (ref 0.1–1)
MONOCYTES NFR BLD AUTO: 6.2 %
NEUTROPHILS # BLD AUTO: 11.32 X10 (3) UL (ref 1.5–7.7)
NEUTROPHILS # BLD AUTO: 11.32 X10(3) UL (ref 1.5–7.7)
NEUTROPHILS NFR BLD AUTO: 86.6 %
NITRITE UR QL STRIP.AUTO: POSITIVE
OSMOLALITY SERPL CALC.SUM OF ELEC: 290 MOSM/KG (ref 275–295)
PCO2 BLDV: 45 MM HG (ref 38–50)
PH BLDV: 7.39 [PH] (ref 7.32–7.43)
PH UR: 5 [PH] (ref 5–8)
PLATELET # BLD AUTO: 327 10(3)UL (ref 150–450)
PO2 BLDV: 25 MM HG (ref 35–40)
POTASSIUM SERPL-SCNC: 3.7 MMOL/L (ref 3.5–5.1)
PROT UR-MCNC: NEGATIVE MG/DL
PUNCTURE CHARGE: NO
RBC # BLD AUTO: 3.94 X10(6)UL
RBC #/AREA URNS AUTO: 3 /HPF
SAO2 % BLDV: 47.2 % (ref 60–85)
SODIUM SERPL-SCNC: 136 MMOL/L (ref 136–145)
SP GR UR STRIP: 1.03 (ref 1–1.03)
UROBILINOGEN UR STRIP-ACNC: <2
WBC # BLD AUTO: 13.1 X10(3) UL (ref 4–11)
WBC #/AREA URNS AUTO: 234 /HPF

## 2020-12-31 PROCEDURE — 82962 GLUCOSE BLOOD TEST: CPT

## 2020-12-31 PROCEDURE — 96361 HYDRATE IV INFUSION ADD-ON: CPT

## 2020-12-31 PROCEDURE — 81025 URINE PREGNANCY TEST: CPT

## 2020-12-31 PROCEDURE — 85025 COMPLETE CBC W/AUTO DIFF WBC: CPT | Performed by: EMERGENCY MEDICINE

## 2020-12-31 PROCEDURE — 96365 THER/PROPH/DIAG IV INF INIT: CPT

## 2020-12-31 PROCEDURE — 96375 TX/PRO/DX INJ NEW DRUG ADDON: CPT

## 2020-12-31 PROCEDURE — 82805 BLOOD GASES W/O2 SATURATION: CPT | Performed by: EMERGENCY MEDICINE

## 2020-12-31 PROCEDURE — 71045 X-RAY EXAM CHEST 1 VIEW: CPT | Performed by: EMERGENCY MEDICINE

## 2020-12-31 PROCEDURE — 74176 CT ABD & PELVIS W/O CONTRAST: CPT | Performed by: EMERGENCY MEDICINE

## 2020-12-31 PROCEDURE — 81001 URINALYSIS AUTO W/SCOPE: CPT | Performed by: EMERGENCY MEDICINE

## 2020-12-31 PROCEDURE — 80048 BASIC METABOLIC PNL TOTAL CA: CPT | Performed by: EMERGENCY MEDICINE

## 2020-12-31 PROCEDURE — 87086 URINE CULTURE/COLONY COUNT: CPT | Performed by: EMERGENCY MEDICINE

## 2020-12-31 PROCEDURE — 87077 CULTURE AEROBIC IDENTIFY: CPT | Performed by: EMERGENCY MEDICINE

## 2020-12-31 PROCEDURE — 87186 SC STD MICRODIL/AGAR DIL: CPT | Performed by: EMERGENCY MEDICINE

## 2020-12-31 PROCEDURE — 99285 EMERGENCY DEPT VISIT HI MDM: CPT

## 2020-12-31 RX ORDER — CEPHALEXIN 500 MG/1
500 CAPSULE ORAL 2 TIMES DAILY
Qty: 20 CAPSULE | Refills: 0 | Status: SHIPPED | OUTPATIENT
Start: 2020-12-31 | End: 2021-01-10

## 2020-12-31 RX ORDER — KETOROLAC TROMETHAMINE 15 MG/ML
15 INJECTION, SOLUTION INTRAMUSCULAR; INTRAVENOUS ONCE
Status: COMPLETED | OUTPATIENT
Start: 2020-12-31 | End: 2020-12-31

## 2020-12-31 RX ORDER — ACETAMINOPHEN 325 MG/1
650 TABLET ORAL ONCE
Status: COMPLETED | OUTPATIENT
Start: 2020-12-31 | End: 2020-12-31

## 2020-12-31 RX ORDER — ACETAMINOPHEN 500 MG
1000 TABLET ORAL ONCE
Status: DISCONTINUED | OUTPATIENT
Start: 2020-12-31 | End: 2020-12-31

## 2020-12-31 RX ORDER — METOCLOPRAMIDE HYDROCHLORIDE 5 MG/ML
10 INJECTION INTRAMUSCULAR; INTRAVENOUS ONCE
Status: COMPLETED | OUTPATIENT
Start: 2020-12-31 | End: 2020-12-31

## 2020-12-31 NOTE — ED NOTES
[address] Sykes states relief in pain and nausea after receiving medication. She denies new complaints.

## 2020-12-31 NOTE — ED INITIAL ASSESSMENT (HPI)
Pt states she's had a migraine for 1.5 months, pt states last night she started to have back cramps, she placed a heating pad on her back and started to have hot and cold sweats and nausea.

## 2020-12-31 NOTE — ED PROVIDER NOTES
She has no  Patient Seen in: [facility] AND [facility] Emergency Department      History   Patient presents with:  Headache    Stated Complaint: headaches, back pain, cold and hot flashes X1 day     HPI    61-year-old female presents for evaluation of headache and HENT:      Head: Normocephalic and atraumatic. Eyes:      Conjunctiva/sclera: Conjunctivae normal.   Neck:      Musculoskeletal: Normal range of motion and neck supple. Cardiovascular:      Rate and Rhythm: Normal rate and regular rhythm.       Heart (*)     All other components within normal limits   [facility] POCT PREGNANCY URINE - Normal   CBC WITH DIFFERENTIAL WITH PLATELET    Narrative: The following orders were created for panel order CBC WITH DIFFERENTIAL WITH PLATELET.   Procedure obstruction or focal abnormality. Duodenum unremarkable. PANCREAS: No lesion, fluid collection, ductal dilatation, or atrophy. AORTA/VASCULAR:   Normal.  No aneurysm. RETROPERITONEUM: No mass or enlarged adenopathy.       BOWEL/MESENTERY: adenopathy. LUNGS/PLEURA: Normal.  No significant pulmonary parenchymal abnormalities. No effusion or pleural thickening. BONES: No fracture or visible bony lesion.     OTHER: Negative.                          =====    CONCLUSION: Normal examina Normal    Consults: No orders of the defined types were placed in this encounter.       MD Discussions or Sign-Outs: None    Impression:  After review and interpretation of the above emergency department workup, the patient was found to have Pyelonephritis daily for 10 days.   Qty: 20 capsule Refills: 0

## 2021-01-01 LAB — SARS-COV-2 RNA RESP QL NAA+PROBE: NOT DETECTED

## 2022-04-07 ENCOUNTER — HOSPITAL ENCOUNTER (EMERGENCY)
Facility: HOSPITAL | Age: 27
Discharge: HOME | End: 2022-04-07
Attending: EMERGENCY MEDICINE
Payer: MEDICAID

## 2022-04-07 VITALS
BODY MASS INDEX: 21.66 KG/M2 | HEIGHT: 65 IN | TEMPERATURE: 98 F | WEIGHT: 130 LBS | RESPIRATION RATE: 22 BRPM | DIASTOLIC BLOOD PRESSURE: 80 MMHG | OXYGEN SATURATION: 98 % | HEART RATE: 104 BPM | SYSTOLIC BLOOD PRESSURE: 126 MMHG

## 2022-04-07 DIAGNOSIS — J06.9 UPPER RESPIRATORY TRACT INFECTION, UNSPECIFIED TYPE: Primary | ICD-10-CM

## 2022-04-07 LAB
B-HCG UR QL: NEGATIVE
BILIRUB UR QL: NEGATIVE
CLARITY UR: CLEAR
COLOR UR: YELLOW
GLUCOSE UR-MCNC: >=500 MG/DL
HGB UR QL STRIP.AUTO: NEGATIVE
KETONES UR-MCNC: 80 MG/DL
NITRITE UR QL STRIP.AUTO: POSITIVE
PH UR: 6 [PH] (ref 5–8)
PROT UR-MCNC: NEGATIVE MG/DL
S PYO AG THROAT QL: NEGATIVE
SP GR UR STRIP: 1.02 (ref 1–1.03)
UROBILINOGEN UR STRIP-ACNC: <2
VIT C UR-MCNC: NEGATIVE MG/DL

## 2022-04-07 PROCEDURE — 87077 CULTURE AEROBIC IDENTIFY: CPT | Performed by: EMERGENCY MEDICINE

## 2022-04-07 PROCEDURE — 81001 URINALYSIS AUTO W/SCOPE: CPT | Performed by: EMERGENCY MEDICINE

## 2022-04-07 PROCEDURE — 81025 URINE PREGNANCY TEST: CPT

## 2022-04-07 PROCEDURE — 99283 EMERGENCY DEPT VISIT LOW MDM: CPT

## 2022-04-07 PROCEDURE — 87880 STREP A ASSAY W/OPTIC: CPT

## 2022-04-07 PROCEDURE — 87186 SC STD MICRODIL/AGAR DIL: CPT | Performed by: EMERGENCY MEDICINE

## 2022-04-07 PROCEDURE — 87086 URINE CULTURE/COLONY COUNT: CPT | Performed by: EMERGENCY MEDICINE

## 2022-04-07 RX ORDER — AZITHROMYCIN 250 MG/1
TABLET, FILM COATED ORAL
Qty: 6 TABLET | Refills: 0 | Status: SHIPPED | OUTPATIENT
Start: 2022-04-07 | End: 2022-04-12

## 2022-04-30 ENCOUNTER — APPOINTMENT (OUTPATIENT)
Dept: CT IMAGING | Facility: HOSPITAL | Age: 27
End: 2022-04-30
Attending: NURSE PRACTITIONER
Payer: MEDICAID

## 2022-04-30 ENCOUNTER — HOSPITAL ENCOUNTER (EMERGENCY)
Facility: HOSPITAL | Age: 27
Discharge: HOME | End: 2022-04-30
Payer: MEDICAID

## 2022-04-30 VITALS
OXYGEN SATURATION: 98 % | TEMPERATURE: 97 F | HEART RATE: 110 BPM | DIASTOLIC BLOOD PRESSURE: 84 MMHG | SYSTOLIC BLOOD PRESSURE: 126 MMHG | BODY MASS INDEX: 28.35 KG/M2 | RESPIRATION RATE: 18 BRPM | WEIGHT: 160 LBS | HEIGHT: 63 IN
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
## 2022-04-30 DIAGNOSIS — S09.93XA FACIAL TRAUMA, INITIAL ENCOUNTER: Primary | ICD-10-CM

## 2022-04-30 PROCEDURE — 99284 EMERGENCY DEPT VISIT MOD MDM: CPT

## 2022-04-30 PROCEDURE — 70486 CT MAXILLOFACIAL W/O DYE: CPT | Performed by: NURSE PRACTITIONER

## 2022-04-30 PROCEDURE — 70450 CT HEAD/BRAIN W/O DYE: CPT | Performed by: NURSE PRACTITIONER

## 2022-04-30 RX ORDER — IBUPROFEN 600 MG/1
600 TABLET ORAL ONCE
Status: COMPLETED | OUTPATIENT
Start: 2022-04-30 | End: 2022-04-30
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
## 2022-05-05 ENCOUNTER — HOSPITAL ENCOUNTER (EMERGENCY)
Facility: HOSPITAL | Age: 27
Discharge: HOME | End: 2022-05-05
Payer: MEDICAID

## 2022-05-05 VITALS
SYSTOLIC BLOOD PRESSURE: 137 MMHG | DIASTOLIC BLOOD PRESSURE: 83 MMHG | RESPIRATION RATE: 15 BRPM | HEART RATE: 99 BPM | TEMPERATURE: 99 F | OXYGEN SATURATION: 100 %

## 2022-05-05 DIAGNOSIS — A60.04 HERPES SIMPLEX VULVOVAGINITIS: Primary | ICD-10-CM

## 2022-05-05 LAB
B-HCG UR QL: NEGATIVE
BILIRUB UR QL: NEGATIVE
COLOR UR: YELLOW
GLUCOSE UR-MCNC: >=500 MG/DL
HYALINE CASTS #/AREA URNS AUTO: PRESENT /LPF
KETONES UR-MCNC: 80 MG/DL
NITRITE UR QL STRIP.AUTO: POSITIVE
PH UR: 5 [PH] (ref 5–8)
PROT UR-MCNC: 30 MG/DL
SP GR UR STRIP: 1.02 (ref 1–1.03)
UROBILINOGEN UR STRIP-ACNC: <2
VIT C UR-MCNC: NEGATIVE MG/DL

## 2022-05-05 PROCEDURE — 87086 URINE CULTURE/COLONY COUNT: CPT | Performed by: NURSE PRACTITIONER

## 2022-05-05 PROCEDURE — 99283 EMERGENCY DEPT VISIT LOW MDM: CPT

## 2022-05-05 PROCEDURE — 87186 SC STD MICRODIL/AGAR DIL: CPT | Performed by: NURSE PRACTITIONER

## 2022-05-05 PROCEDURE — 81001 URINALYSIS AUTO W/SCOPE: CPT | Performed by: NURSE PRACTITIONER

## 2022-05-05 PROCEDURE — 87088 URINE BACTERIA CULTURE: CPT | Performed by: NURSE PRACTITIONER

## 2022-05-05 PROCEDURE — 81025 URINE PREGNANCY TEST: CPT

## 2022-05-05 RX ORDER — HYDROCODONE BITARTRATE AND ACETAMINOPHEN 5; 325 MG/1; MG/1
1 TABLET ORAL ONCE
Status: COMPLETED | OUTPATIENT
Start: 2022-05-05 | End: 2022-05-05

## 2022-05-05 RX ORDER — CEPHALEXIN 500 MG/1
500 CAPSULE ORAL 2 TIMES DAILY
Qty: 14 CAPSULE | Refills: 0 | Status: SHIPPED | OUTPATIENT
Start: 2022-05-05 | End: 2022-05-12

## 2022-05-05 RX ORDER — VALACYCLOVIR HYDROCHLORIDE 1 G/1
1000 TABLET, FILM COATED ORAL EVERY 12 HOURS
Qty: 10 TABLET | Refills: 0 | Status: SHIPPED | OUTPATIENT
Start: 2022-05-05 | End: 2022-05-10

## 2022-05-05 RX ORDER — HYDROCODONE BITARTRATE AND ACETAMINOPHEN 5; 325 MG/1; MG/1
1-2 TABLET ORAL EVERY 6 HOURS PRN
Qty: 10 TABLET | Refills: 0 | Status: SHIPPED | OUTPATIENT
Start: 2022-05-05 | End: 2022-05-10

## 2023-01-22 NOTE — ED INITIAL ASSESSMENT (HPI)
Patient presents with genital pain. PAST MEDICAL HISTORY:  Adolescent idiopathic scoliosis, unspecified spinal region LEFT SIDED.    Moderate persistent asthma with acute exacerbation

## 2023-02-10 ENCOUNTER — HOSPITAL ENCOUNTER (EMERGENCY)
Age: 28
Discharge: LEFT BEFORE DISCHARGE | End: 2023-02-10

## 2023-02-10 VITALS
RESPIRATION RATE: 18 BRPM | SYSTOLIC BLOOD PRESSURE: 127 MMHG | TEMPERATURE: 98.2 F | HEART RATE: 113 BPM | OXYGEN SATURATION: 100 % | DIASTOLIC BLOOD PRESSURE: 81 MMHG

## 2023-02-10 LAB
ALBUMIN SERPL-MCNC: 3.1 G/DL (ref 3.6–5.1)
ALBUMIN/GLOB SERPL: 0.7 {RATIO} (ref 1–2.4)
ALP SERPL-CCNC: 139 UNITS/L (ref 45–117)
ALT SERPL-CCNC: 25 UNITS/L
ANION GAP SERPL CALC-SCNC: 8 MMOL/L (ref 7–19)
APPEARANCE UR: ABNORMAL
AST SERPL-CCNC: 23 UNITS/L
ATRIAL RATE (BPM): 115
BACTERIA #/AREA URNS HPF: ABNORMAL /HPF
BASOPHILS # BLD: 0 K/MCL (ref 0–0.3)
BASOPHILS NFR BLD: 0 %
BILIRUB SERPL-MCNC: 0.2 MG/DL (ref 0.2–1)
BILIRUB UR QL STRIP: NEGATIVE
BUN SERPL-MCNC: 16 MG/DL (ref 6–20)
BUN/CREAT SERPL: 30 (ref 7–25)
CALCIUM SERPL-MCNC: 9.3 MG/DL (ref 8.4–10.2)
CHLORIDE SERPL-SCNC: 108 MMOL/L (ref 97–110)
CO2 SERPL-SCNC: 26 MMOL/L (ref 21–32)
COLOR UR: ABNORMAL
CREAT SERPL-MCNC: 0.53 MG/DL (ref 0.51–0.95)
DEPRECATED RDW RBC: 40.6 FL (ref 39–50)
EOSINOPHIL # BLD: 0.1 K/MCL (ref 0–0.5)
EOSINOPHIL NFR BLD: 1 %
ERYTHROCYTE [DISTWIDTH] IN BLOOD: 13.6 % (ref 11–15)
FASTING DURATION TIME PATIENT: ABNORMAL H
GFR SERPLBLD BASED ON 1.73 SQ M-ARVRAT: >90 ML/MIN
GLOBULIN SER-MCNC: 4.7 G/DL (ref 2–4)
GLUCOSE SERPL-MCNC: 70 MG/DL (ref 70–99)
GLUCOSE UR STRIP-MCNC: >1000 MG/DL
HCG UR QL: NEGATIVE
HCT VFR BLD CALC: 39.6 % (ref 36–46.5)
HGB BLD-MCNC: 12.6 G/DL (ref 12–15.5)
HGB UR QL STRIP: NEGATIVE
HYALINE CASTS #/AREA URNS LPF: ABNORMAL /LPF
IMM GRANULOCYTES # BLD AUTO: 0 K/MCL (ref 0–0.2)
IMM GRANULOCYTES # BLD: 0 %
KETONES UR STRIP-MCNC: 15 MG/DL
LEUKOCYTE ESTERASE UR QL STRIP: NEGATIVE
LIPASE SERPL-CCNC: 173 UNITS/L (ref 73–393)
LYMPHOCYTES # BLD: 1.3 K/MCL (ref 1–4.8)
LYMPHOCYTES NFR BLD: 14 %
MCH RBC QN AUTO: 26.3 PG (ref 26–34)
MCHC RBC AUTO-ENTMCNC: 31.8 G/DL (ref 32–36.5)
MCV RBC AUTO: 82.7 FL (ref 78–100)
MONOCYTES # BLD: 0.8 K/MCL (ref 0.3–0.9)
MONOCYTES NFR BLD: 8 %
MUCOUS THREADS URNS QL MICRO: PRESENT
NEUTROPHILS # BLD: 7.3 K/MCL (ref 1.8–7.7)
NEUTROPHILS NFR BLD: 77 %
NITRITE UR QL STRIP: POSITIVE
NRBC BLD MANUAL-RTO: 0 /100 WBC
P AXIS (DEGREES): 59
PH UR STRIP: 7 [PH] (ref 5–7)
PLATELET # BLD AUTO: 306 K/MCL (ref 140–450)
POTASSIUM SERPL-SCNC: 4.3 MMOL/L (ref 3.4–5.1)
PR-INTERVAL (MSEC): 150
PROT SERPL-MCNC: 7.8 G/DL (ref 6.4–8.2)
PROT UR STRIP-MCNC: ABNORMAL MG/DL
QRS-INTERVAL (MSEC): 70
QT-INTERVAL (MSEC): 318
QTC: 440
R AXIS (DEGREES): 54
RBC # BLD: 4.79 MIL/MCL (ref 4–5.2)
RBC #/AREA URNS HPF: ABNORMAL /HPF
REPORT TEXT: NORMAL
SODIUM SERPL-SCNC: 138 MMOL/L (ref 135–145)
SP GR UR STRIP: 1.02 (ref 1–1.03)
SQUAMOUS #/AREA URNS HPF: ABNORMAL /HPF
T AXIS (DEGREES): 25
UROBILINOGEN UR STRIP-MCNC: 0.2 MG/DL
VENTRICULAR RATE EKG/MIN (BPM): 115
WBC # BLD: 9.5 K/MCL (ref 4.2–11)
WBC #/AREA URNS HPF: ABNORMAL /HPF

## 2023-02-10 PROCEDURE — 84703 CHORIONIC GONADOTROPIN ASSAY: CPT

## 2023-02-10 PROCEDURE — 10003627 HB COUNTER ED NO SERVICE

## 2023-02-10 PROCEDURE — 83690 ASSAY OF LIPASE: CPT | Performed by: EMERGENCY MEDICINE

## 2023-02-10 PROCEDURE — 93005 ELECTROCARDIOGRAM TRACING: CPT | Performed by: EMERGENCY MEDICINE

## 2023-02-10 PROCEDURE — 87086 URINE CULTURE/COLONY COUNT: CPT | Performed by: EMERGENCY MEDICINE

## 2023-02-10 PROCEDURE — 36415 COLL VENOUS BLD VENIPUNCTURE: CPT

## 2023-02-10 PROCEDURE — 81001 URINALYSIS AUTO W/SCOPE: CPT | Performed by: EMERGENCY MEDICINE

## 2023-02-10 PROCEDURE — 85025 COMPLETE CBC W/AUTO DIFF WBC: CPT | Performed by: EMERGENCY MEDICINE

## 2023-02-10 PROCEDURE — 80053 COMPREHEN METABOLIC PANEL: CPT | Performed by: EMERGENCY MEDICINE

## 2023-02-12 LAB — BACTERIA UR CULT: ABNORMAL

## 2024-01-04 ENCOUNTER — LAB ENCOUNTER (OUTPATIENT)
Dept: LAB | Facility: HOSPITAL | Age: 29
End: 2024-01-04
Attending: OBSTETRICS & GYNECOLOGY
Payer: MEDICAID

## 2024-01-04 ENCOUNTER — OFFICE VISIT (OUTPATIENT)
Dept: OBGYN CLINIC | Facility: CLINIC | Age: 29
End: 2024-01-04

## 2024-01-04 VITALS
SYSTOLIC BLOOD PRESSURE: 127 MMHG | DIASTOLIC BLOOD PRESSURE: 85 MMHG | HEART RATE: 120 BPM | BODY MASS INDEX: 32 KG/M2 | WEIGHT: 179.81 LBS

## 2024-01-04 DIAGNOSIS — N92.1 PROLONGED MENSTRUATION: Primary | ICD-10-CM

## 2024-01-04 DIAGNOSIS — N92.1 PROLONGED MENSTRUATION: ICD-10-CM

## 2024-01-04 LAB
BASOPHILS # BLD AUTO: 0.06 X10(3) UL (ref 0–0.2)
BASOPHILS NFR BLD AUTO: 0.9 %
DEPRECATED RDW RBC AUTO: 37.3 FL (ref 35.1–46.3)
EOSINOPHIL # BLD AUTO: 0.1 X10(3) UL (ref 0–0.7)
EOSINOPHIL NFR BLD AUTO: 1.6 %
ERYTHROCYTE [DISTWIDTH] IN BLOOD BY AUTOMATED COUNT: 13.1 % (ref 11–15)
EST. AVERAGE GLUCOSE BLD GHB EST-MCNC: 200 MG/DL (ref 68–126)
HBA1C MFR BLD: 8.6 % (ref ?–5.7)
HCG SERPL QL: NEGATIVE
HCT VFR BLD AUTO: 35.3 %
HGB BLD-MCNC: 11.7 G/DL
IMM GRANULOCYTES # BLD AUTO: 0.02 X10(3) UL (ref 0–1)
IMM GRANULOCYTES NFR BLD: 0.3 %
LYMPHOCYTES # BLD AUTO: 1.95 X10(3) UL (ref 1–4)
LYMPHOCYTES NFR BLD AUTO: 30.9 %
MCH RBC QN AUTO: 26.4 PG (ref 26–34)
MCHC RBC AUTO-ENTMCNC: 33.1 G/DL (ref 31–37)
MCV RBC AUTO: 79.7 FL
MONOCYTES # BLD AUTO: 0.4 X10(3) UL (ref 0.1–1)
MONOCYTES NFR BLD AUTO: 6.3 %
NEUTROPHILS # BLD AUTO: 3.79 X10 (3) UL (ref 1.5–7.7)
NEUTROPHILS # BLD AUTO: 3.79 X10(3) UL (ref 1.5–7.7)
NEUTROPHILS NFR BLD AUTO: 60 %
PLATELET # BLD AUTO: 324 10(3)UL (ref 150–450)
RBC # BLD AUTO: 4.43 X10(6)UL
TSI SER-ACNC: 0.96 MIU/ML (ref 0.55–4.78)
WBC # BLD AUTO: 6.3 X10(3) UL (ref 4–11)

## 2024-01-04 PROCEDURE — 3074F SYST BP LT 130 MM HG: CPT | Performed by: OBSTETRICS & GYNECOLOGY

## 2024-01-04 PROCEDURE — 3079F DIAST BP 80-89 MM HG: CPT | Performed by: OBSTETRICS & GYNECOLOGY

## 2024-01-04 PROCEDURE — 99203 OFFICE O/P NEW LOW 30 MIN: CPT | Performed by: OBSTETRICS & GYNECOLOGY

## 2024-01-04 PROCEDURE — 83036 HEMOGLOBIN GLYCOSYLATED A1C: CPT

## 2024-01-04 PROCEDURE — 84703 CHORIONIC GONADOTROPIN ASSAY: CPT

## 2024-01-04 PROCEDURE — 36415 COLL VENOUS BLD VENIPUNCTURE: CPT

## 2024-01-04 PROCEDURE — 85025 COMPLETE CBC W/AUTO DIFF WBC: CPT

## 2024-01-04 PROCEDURE — 84443 ASSAY THYROID STIM HORMONE: CPT

## 2024-01-12 NOTE — PROGRESS NOTES
HPI:  The patient is a 29 yo F c/o prolonged menstruation.  LMP 11.  Normal flow but continued to bleed, becoming heavy around .  She heavily bled leading to ER presentation and admission requiring blood tranfusion, provera, and given depot provera during admission to help lighten flow . Iris stopped around daily but came back on Dec 30th.  Pt with h/o irregular cycles and has needed provera in the past.  H/o DM, dx at 3 yo.  Last A1c 9.0.  Pt is interested in future conception as wel.  H/o stillbirth in the past.        Reviewed medical and surgical history below       OBSTETRICS HISTORY:  OB History    Para Term  AB Living   1 1 0 0 0 0   SAB IAB Ectopic Multiple Live Births   0 0 0 0 0       GYNE HISTORY:  History   Sexual Activity    Sexual activity: Yes    Birth control/ protection: Injection            MEDICAL HISTORY:  Past Medical History:   Diagnosis Date    Anxiety     Depression     Hyperlipidemia     Insomnia 2017    Type 1 diabetes mellitus (HCC)        SURGICAL HISTORY:  Past Surgical History:   Procedure Laterality Date    CONTINUE INSULIN PUMP         SOCIAL HISTORY:  Social History     Socioeconomic History    Marital status: Single     Spouse name: Not on file    Number of children: Not on file    Years of education: Not on file    Highest education level: Not on file   Occupational History    Not on file   Tobacco Use    Smoking status: Never    Smokeless tobacco: Never   Vaping Use    Vaping Use: Never used   Substance and Sexual Activity    Alcohol use: Yes     Alcohol/week: 0.0 standard drinks of alcohol     Comment: wine - every other weekend    Drug use: Yes     Types: Cannabis     Comment: 4 days a week     Sexual activity: Yes     Birth control/protection: Injection   Other Topics Concern    Caffeine Concern Not Asked    Exercise Not Asked    Seat Belt Not Asked    Special Diet Not Asked    Stress Concern Not Asked    Weight Concern Not Asked   Social  History Narrative    Not on file     Social Determinants of Health     Financial Resource Strain: Not on file   Food Insecurity: Not on file   Transportation Needs: Not on file   Physical Activity: Not on file   Stress: Not on file   Social Connections: Not on file   Housing Stability: Not on file       FAMILY HISTORY:  Family History   Problem Relation Age of Onset    Cancer Maternal Grandmother     Diabetes Sister     Diabetes Brother     Other (Other) Mother     Diabetes Brother     Diabetes Paternal Grandfather        MEDICATIONS:    Current Outpatient Medications:     insulin glargine 100 UNIT/ML Subcutaneous Solution Pen-injector, Inject 30 Units into the skin. Every morning and at night   Basaglar, Disp: , Rfl:     Insulin Lispro 100 UNIT/ML Subcutaneous Solution, Inject 15 Units into the skin nightly., Disp: , Rfl:     Insulin Lispro (HUMALOG) 100 UNIT/ML Subcutaneous Solution Cartridge, Inject 20 Units into the skin 3 (three) times daily before meals. (Patient taking differently: Inject 25 Units into the skin every morning. Admelog), Disp: 10 mL, Rfl: 0    Glucose Blood (ACCU-CHEK GUIDE) In Vitro Strip, 1 strip by In Vitro route 3 (three) times daily before meals., Disp: 50 strip, Rfl: 3    ACCU-CHEK FASTCLIX LANCETS Does not apply Misc, Test blood 3 times daily before meals and for signs, symptoms of hypoglycemia., Disp: 50 each, Rfl: 3    Blood Glucose Monitoring Suppl (ACCU-CHEK GUIDE) w/Device Does not apply Kit, 1 kit by Does not apply route 3 (three) times daily before meals., Disp: 1 kit, Rfl: 0    Insulin Pen Needle 32G X 4 MM Does not apply Misc, 3 times daily with humalog, Disp: 100 each, Rfl: 0    Insulin Syringe 31G X 5/16\" 1 ML Does not apply Misc, 2 times daily with lantus, Disp: 100 each, Rfl: 0    ALLERGIES:    Allergies   Allergen Reactions    Shellfish-Derived Products ANAPHYLAXIS and HIVES         Review of Systems:  Constitutional:  Denies fevers and chills   Cardiovascular:  denies  chest pain or palpitations  Respiratory:  denies shortness of breath  Gastrointestinal:  denies heartburn, abdominal pain, diarrhea or constipation  Genitourinary:  denies dysuria, incontinence, abnormal vaginal discharge, vaginal itching  Musculoskeletal:  denies back pain.  Skin/Breast:  Denies any breast pain, lumps, or discharge.   Neurological:  denies headaches, extremity weakness  Psychiatric: denies depression or anxiety.      Vitals:    01/04/24 1409   BP: 127/85   Pulse: 120       PHYSICAL EXAM:   Constitutional: well developed, well nourished  Head/Face: normocephalic  Psychiatric: Appropriate mood and affect    Pelvic Exam:  External Genitalia: normal appearance, hair distribution, and no lesions  Urethral Meatus:  normal in size, location, without lesions and prolapse  Bladder:  No fullness, masses or tenderness  Vagina:  Normal appearance without lesions, no abnormal discharge; 5cc blood in vault  Cervix:  Normal without tenderness on motion  Uterus: normal in size, contour, position, mobility, without tenderness  Adnexa: normal without masses or tenderness  Perineum: normal    Assessment/Plan:    Batsheva was seen today for gyn problem.    Diagnoses and all orders for this visit:    Prolonged menstruation  -     CBC W Differential W Platelet; Future  -     HCG, Beta Subunit, Qual; Future  -     TSH Assay, Thyroid Stim Hormone; Future  -     Hemoglobin A1C; Future  -     US PELVIS W EV (CPT=76856/25982); Future      Dpeo provera was given 11/28---I did explain it will take 3-6 months of use before bleeding controled  Check labs and pelvic US  Discussed pt would need [facility] care, [facility] at [facility] due to Class D DM and h/o stillbirth---her h/o is too complicated for our practice  Will call with results

## 2024-01-24 ENCOUNTER — HOSPITAL ENCOUNTER (EMERGENCY)
Facility: HOSPITAL | Age: 29
Discharge: HOME | End: 2024-01-24
Attending: EMERGENCY MEDICINE
Payer: MEDICAID

## 2024-01-24 ENCOUNTER — TELEPHONE (OUTPATIENT)
Dept: OBGYN CLINIC | Facility: CLINIC | Age: 29
End: 2024-01-24

## 2024-01-24 ENCOUNTER — APPOINTMENT (OUTPATIENT)
Dept: CT IMAGING | Facility: HOSPITAL | Age: 29
End: 2024-01-24
Attending: EMERGENCY MEDICINE
Payer: MEDICAID

## 2024-01-24 VITALS
TEMPERATURE: 99 F | OXYGEN SATURATION: 100 % | SYSTOLIC BLOOD PRESSURE: 161 MMHG | DIASTOLIC BLOOD PRESSURE: 78 MMHG | HEART RATE: 106 BPM | WEIGHT: 175 LBS | RESPIRATION RATE: 16 BRPM | BODY MASS INDEX: 31.01 KG/M2 | HEIGHT: 63 IN

## 2024-01-24 DIAGNOSIS — N93.9 VAGINAL BLEEDING: Primary | ICD-10-CM

## 2024-01-24 LAB
ANION GAP SERPL CALC-SCNC: 7 MMOL/L (ref 0–18)
ANTIBODY SCREEN: NEGATIVE
ATRIAL RATE: 109 BPM
B-HCG UR QL: NEGATIVE
BASOPHILS # BLD AUTO: 0.05 X10(3) UL (ref 0–0.2)
BASOPHILS NFR BLD AUTO: 0.8 %
BUN BLD-MCNC: 18 MG/DL (ref 9–23)
BUN/CREAT SERPL: 13.7 (ref 10–20)
CALCIUM BLD-MCNC: 9.3 MG/DL (ref 8.7–10.4)
CHLORIDE SERPL-SCNC: 106 MMOL/L (ref 98–112)
CO2 SERPL-SCNC: 24 MMOL/L (ref 21–32)
CREAT BLD-MCNC: 1.31 MG/DL
DEPRECATED RDW RBC AUTO: 38.5 FL (ref 35.1–46.3)
EGFRCR SERPLBLD CKD-EPI 2021: 58 ML/MIN/1.73M2 (ref 60–?)
EOSINOPHIL # BLD AUTO: 0.14 X10(3) UL (ref 0–0.7)
EOSINOPHIL NFR BLD AUTO: 2.1 %
ERYTHROCYTE [DISTWIDTH] IN BLOOD BY AUTOMATED COUNT: 13.2 % (ref 11–15)
GLUCOSE BLD-MCNC: 235 MG/DL (ref 70–99)
HCT VFR BLD AUTO: 31.7 %
HGB BLD-MCNC: 10.1 G/DL
IMM GRANULOCYTES # BLD AUTO: 0.02 X10(3) UL (ref 0–1)
IMM GRANULOCYTES NFR BLD: 0.3 %
LYMPHOCYTES # BLD AUTO: 1.88 X10(3) UL (ref 1–4)
LYMPHOCYTES NFR BLD AUTO: 28.4 %
MCH RBC QN AUTO: 25.6 PG (ref 26–34)
MCHC RBC AUTO-ENTMCNC: 31.9 G/DL (ref 31–37)
MCV RBC AUTO: 80.3 FL
MONOCYTES # BLD AUTO: 0.42 X10(3) UL (ref 0.1–1)
MONOCYTES NFR BLD AUTO: 6.3 %
NEUTROPHILS # BLD AUTO: 4.11 X10 (3) UL (ref 1.5–7.7)
NEUTROPHILS # BLD AUTO: 4.11 X10(3) UL (ref 1.5–7.7)
NEUTROPHILS NFR BLD AUTO: 62.1 %
OSMOLALITY SERPL CALC.SUM OF ELEC: 293 MOSM/KG (ref 275–295)
P AXIS: 73 DEGREES
P-R INTERVAL: 144 MS
PLATELET # BLD AUTO: 294 10(3)UL (ref 150–450)
POTASSIUM SERPL-SCNC: 4.2 MMOL/L (ref 3.5–5.1)
Q-T INTERVAL: 334 MS
QRS DURATION: 70 MS
QTC CALCULATION (BEZET): 449 MS
R AXIS: 69 DEGREES
RBC # BLD AUTO: 3.95 X10(6)UL
RH BLOOD TYPE: POSITIVE
RH BLOOD TYPE: POSITIVE
SODIUM SERPL-SCNC: 137 MMOL/L (ref 136–145)
T AXIS: 47 DEGREES
TROPONIN I SERPL HS-MCNC: <3 NG/L
VENTRICULAR RATE: 109 BPM
WBC # BLD AUTO: 6.6 X10(3) UL (ref 4–11)

## 2024-01-24 PROCEDURE — 86850 RBC ANTIBODY SCREEN: CPT | Performed by: EMERGENCY MEDICINE

## 2024-01-24 PROCEDURE — 99284 EMERGENCY DEPT VISIT MOD MDM: CPT

## 2024-01-24 PROCEDURE — 85025 COMPLETE CBC W/AUTO DIFF WBC: CPT | Performed by: EMERGENCY MEDICINE

## 2024-01-24 PROCEDURE — 36415 COLL VENOUS BLD VENIPUNCTURE: CPT

## 2024-01-24 PROCEDURE — 93010 ELECTROCARDIOGRAM REPORT: CPT

## 2024-01-24 PROCEDURE — 93005 ELECTROCARDIOGRAM TRACING: CPT

## 2024-01-24 PROCEDURE — 80048 BASIC METABOLIC PNL TOTAL CA: CPT | Performed by: EMERGENCY MEDICINE

## 2024-01-24 PROCEDURE — 86901 BLOOD TYPING SEROLOGIC RH(D): CPT | Performed by: EMERGENCY MEDICINE

## 2024-01-24 PROCEDURE — 81025 URINE PREGNANCY TEST: CPT

## 2024-01-24 PROCEDURE — 71260 CT THORAX DX C+: CPT | Performed by: EMERGENCY MEDICINE

## 2024-01-24 PROCEDURE — 99285 EMERGENCY DEPT VISIT HI MDM: CPT

## 2024-01-24 PROCEDURE — 84484 ASSAY OF TROPONIN QUANT: CPT | Performed by: EMERGENCY MEDICINE

## 2024-01-24 PROCEDURE — 86900 BLOOD TYPING SEROLOGIC ABO: CPT | Performed by: EMERGENCY MEDICINE

## 2024-01-24 RX ORDER — MEDROXYPROGESTERONE ACETATE 10 MG/1
20 TABLET ORAL DAILY
Qty: 20 TABLET | Refills: 0 | Status: SHIPPED | OUTPATIENT
Start: 2024-01-24

## 2024-01-24 NOTE — ED QUICK NOTES
Went in for discharge, pt had removed PIV, was dressed.  Asked if I had the papers in my hand and walked out.  PIV found on bed.

## 2024-01-24 NOTE — TELEPHONE ENCOUNTER
Pt states she went to ED and nothing was done was sent home with no instructions, states no US or blood work was done, states she can't just continue to bleed, calling for next steps. Please advise

## 2024-01-24 NOTE — TELEPHONE ENCOUNTER
Sundar Ramirez, DO  [facility] Ob/Gyne Clinical Staff  Caller: Unspecified (Today,  9:33 AM)    [place] symptomatic, she should go to the nearest ER.      Pt informed and instructed to call tomorrow with update on ER visit.

## 2024-01-24 NOTE — TELEPHONE ENCOUNTER
Pt still has predoid this month , Pt birth control is not working and the pills given at hospital not working  Pt is asking to discuss with nurse

## 2024-01-24 NOTE — TELEPHONE ENCOUNTER
Pt stated she went to ER for her heavy vaginal bleeding and they \"didn't do much for her\". Pt had some labs done and CT scan done to r/o PE. Hemoglobin on CBC was 10.1. pt is not on any iron supplementation currently.    Pt stated that she continues to soak through super plus tampons and is changing them about every 1.5 hours. Pt stated that she has some pelvic pain/cramping. Pt advised that she needs to get pelvic US scheduled that DONTAE ordered for her at time of appt on 1/4. Pt stated that DONTAE advised she waits until after her period ends to get US done, and since bleeding has not stopped since seeing DONTAE she has not scheduled appt. Advised that she should still schedule US since they are booking out weeks in advance. Pt verbalized understanding.  pt informed message will be sent to DONTAE for next step. Pt verbalized understanding.

## 2024-01-24 NOTE — ED PROVIDER NOTES
Patient Seen in: [facility] Emergency Department      History   No chief complaint on file.    Stated Complaint: Eval-G, Chest Pain    Subjective:   HPI    Patient is a 28-year-old female with a history of diabetes on an insulin pump.  She has had irregular menses for a few months with prolonged bleeding.  She was admitted to [facility] in November for symptomatic anemia and received a blood transfusion.  She is been following with Dr. Ramirez from [facility].  She states she is again been having bleeding since December.  She contacted her doctor today because she was getting short of breath with exertion and was told to come to the emergency room.  She has tried birth control pills and Provera.    Objective:   Past Medical History:   Diagnosis Date    Anxiety     Depression     Hyperlipidemia     Insomnia 2/2017    Type 1 diabetes mellitus (HCC)               Past Surgical History:   Procedure Laterality Date    CONTINUE INSULIN PUMP                  Social History     Socioeconomic History    Marital status: Single   Tobacco Use    Smoking status: Never    Smokeless tobacco: Never   Vaping Use    Vaping Use: Never used   Substance and Sexual Activity    Alcohol use: Yes     Alcohol/week: 0.0 standard drinks of alcohol     Comment: wine - every other weekend    Drug use: Yes     Types: Cannabis     Comment: 4 days a week     Sexual activity: Yes     Birth control/protection: Injection              Review of Systems    Positive for stated complaint: Eval-G, Chest Pain  Other systems are as noted in HPI.  Constitutional and vital signs reviewed.      All other systems reviewed and negative except as noted above.    Physical Exam     ED Triage Vitals [01/24/24 1156]   /87   Pulse 106   Resp 16   Temp 98.5 °F (36.9 °C)   Temp src    SpO2 98 %   O2 Device None (Room air)       Current:BP (!) 161/78   Pulse 106   Temp 98.5 °F (36.9 °C)   Resp 16   Ht 160 cm (5' 3\")   Wt 79.4 kg   LMP 12/27/2023    SpO2 100%   BMI 31.00 kg/m²         Physical Exam    Constitutional: Oriented to person, place, and time. Appears well-developed and well-nourished.   HEENT:   Head: Normocephalic and atraumatic.   Right Ear: External ear normal.   Left Ear: External ear normal.   Nose: Nose normal.   Mouth/Throat: Oropharynx is clear and moist.   Eyes: Conjunctivae and EOM are normal. Pupils are equal, round, and reactive to light.   Neck: Neck supple.   Cardiovascular: Normal rate, regular rhythm, normal heart sounds and intact distal pulses.    Pulmonary/Chest: Effort normal and breath sounds normal. No respiratory distress.   Abdominal: Soft. Bowel sounds are normal. Exhibits no distension and no mass. There is no tenderness. There is no rebound and no guarding.   Musculoskeletal: Normal range of motion. Exhibits no edema or tenderness.   Lymphadenopathy: No cervical adenopathy.   Neurological: Alert and oriented to person, place, and time. Normal reflexes. No cranial nerve deficit. No motor os sensory defecits noted Coordination normal.   Skin: Skin is warm and dry.   Psychiatric: Normal mood and affect. Behavior is normal. Judgment and thought content normal.   Nursing note and vitals reviewed.        ED Course     Labs Reviewed   BASIC METABOLIC PANEL (8) - Abnormal; Notable for the following components:       Result Value    Glucose 235 (*)     Creatinine 1.31 (*)     eGFR-Cr 58 (*)     All other components within normal limits   CBC W/ DIFFERENTIAL - Abnormal; Notable for the following components:    HGB 10.1 (*)     HCT 31.7 (*)     MCH 25.6 (*)     All other components within normal limits   TROPONIN I HIGH SENSITIVITY - Normal   POCT PREGNANCY URINE - Normal   CBC WITH DIFFERENTIAL WITH PLATELET    Narrative:     The following orders were created for panel order CBC With Differential With Platelet.  Procedure                               Abnormality         Status                     ---------                                -----------         ------                     CBC W/ DIFFERENTIAL[[id number]]          Abnormal            Final result                 Please view results for these tests on the individual orders.   ANTIBODY SCREEN   TYPE AND SCREEN    Narrative:     The following orders were created for panel order Type and screen.  Procedure                               Abnormality         Status                     ---------                               -----------         ------                     ABORH (Blood Type)[[id number]]                               In process                 Antibody Screen[[id number]]                                  Final result                 Please view results for these tests on the individual orders.   ABORH (BLOOD TYPE)   ABORH CONFIRMATION     EKG    Rate, intervals and axes as noted on EKG Report.  Rate: 109  Rhythm: Sinus Rhythm  Reading: Sinus tachycardia                          MDM      Use of independent historian:     I personally reviewed and interpreted the images :     CT CHEST PE AORTA (IV ONLY) (CPT=71260)    Result Date: 1/24/2024  CONCLUSION: No PE or acute aortic syndrome    Dictated by (CST): Abhi Morris MD on 1/24/2024 at 2:12 PM     Finalized by (CST): Abhi Morris MD on 1/24/2024 at 2:16 PM           Vitals:    01/24/24 1156 01/24/24 1400 01/24/24 1415   BP: 155/87 139/79 (!) 161/78   Pulse: 106 107 106   Resp: 16     Temp: 98.5 °F (36.9 °C)     SpO2: 98% 99% 100%   Weight: 79.4 kg     Height: 160 cm (5' 3\")       *I personally reviewed and interpreted all ED vitals.    Pulse Ox: 98%, Room air, Normal     EKG interpretation above independently interpreted by me    Monitor Interpretation:   normal sinus rhythm independently interpreted by me    Differential Diagnosis/ Diagnostic Considerations: palpitations and sob in setting of prolonged menst bleeding, consider symptomatic anemia.  Consider dysfunctional uterine bleeding.  Consider pregnancy.    Medical Record  Review: I personally reviewed available prior medical records for any recent pertinent discharge summaries, testing, and procedures and reviewed those reports and found patient admitted at [facility] for symptomatic anemia around Thanksgiving notes reviewed.    Complicating Factors: The patient already has birth control pill and progesterone use which contribute to the complexity of this ED evaluation.    Social determinants of health:    Prescription drug management:      Shared Decision Making:    ED Course: Hemoglobin was 10.1.  In light of this we will check CT to rule out PE as risk factors of hormone use.    EKG troponin and CT to rule out PE were all negative will discharge home and encouraged her to follow-up with her OB/GYN [facility] she agrees with this plan.    Discussion of management with other healthcare providers:    Condition upon leaving the department: Stable                                     Medical Decision Making      Disposition and Plan     Clinical Impression:  1. Vaginal bleeding         Disposition:  Discharge  1/24/2024  2:18 pm    Follow-up:  Sundar Ramirez DO  [address] [facility] [address]  [phone]    Follow up in 2 day(s)            Medications Prescribed:  Current Discharge Medication List

## 2024-01-24 NOTE — ED INITIAL ASSESSMENT (HPI)
Pt arrives for LINETTE and intermittent chest pain which is worsening since November  Pt reports her OB/GYN advised she come to ED for evaluation as she has been having consistent vaginal bleeding since November. Pt was seen at the time it began, started on depo injections and was given a blood transfusion at that time.

## 2024-01-24 NOTE — TELEPHONE ENCOUNTER
Pt was seen on 1/4/2024 for prolonged bleeding.  Pt was in the ED in 11/2023 for heavy bleeding and was given provera and a depo injection to help stop the bleeding.  Per visit notes from 1/4, pt had states bleeding stopped on 12/25 but restarted again on 12/30.  Pt states her bleeding has not stopped since then.  Pt states the bleeding has become heavier yesterday.  Pt is soaking through a super plus tampon every 2 hours and passing clots nickel to quarter size.  Pt also reports SOB, chest pains, heart palpitations and was lightheaded this morning.  Pt states the SOB is daily and the chest pains are 'random\".  Pt has needed a transfusion in the past due to her bleeding.  Pt advised to go to the ER for eval with these symptoms.  Pt expressed understanding.  Message to DONTAE for further recs and sign off.

## 2024-02-12 ENCOUNTER — HOSPITAL ENCOUNTER (OUTPATIENT)
Dept: ULTRASOUND IMAGING | Age: 29
Discharge: HOME | End: 2024-02-12
Attending: OBSTETRICS & GYNECOLOGY
Payer: MEDICAID

## 2024-02-12 ENCOUNTER — TELEPHONE (OUTPATIENT)
Dept: OBGYN CLINIC | Facility: CLINIC | Age: 29
End: 2024-02-12

## 2024-02-12 DIAGNOSIS — N92.1 PROLONGED MENSTRUATION: ICD-10-CM

## 2024-02-12 DIAGNOSIS — N92.1 PROLONGED MENSTRUATION: Primary | ICD-10-CM

## 2024-02-12 PROCEDURE — 76856 US EXAM PELVIC COMPLETE: CPT | Performed by: OBSTETRICS & GYNECOLOGY

## 2024-02-12 PROCEDURE — 76830 TRANSVAGINAL US NON-OB: CPT | Performed by: OBSTETRICS & GYNECOLOGY

## 2024-02-12 RX ORDER — MEDROXYPROGESTERONE ACETATE 10 MG/1
40 TABLET ORAL DAILY
Qty: 40 TABLET | Refills: 0 | Status: SHIPPED | OUTPATIENT
Start: 2024-02-12 | End: 2024-02-22

## 2024-02-12 NOTE — TELEPHONE ENCOUNTER
US reviewed and likely clot given bleeding. Please get status update of her bleeding.  I see she went to ER on 2/10 and hgb 8.6.  She got fluids and 1 unite PRBCs.  Unfortunately she isn't a candidate for COCPs with her DM type 1 dx > 20 years ago .if she is still bleedng heavily, she needs provera 40mg daily to see if we can get her bleeding to stop until she sees me.  She has an appt to see me in 9 days.

## 2024-02-12 NOTE — TELEPHONE ENCOUNTER
Pt called and informed of DONATE recs. Pt states she is still bleeding heavy, states soaking tampons but not soaking disposable underwear as before. Pt informed will start on Provera 40 mg daily. Pt asking if MD has sooner appt, informed he does not but did place pt on waitlist. Pt states understanding. Pharm confirmed and rx sent.

## 2024-02-21 ENCOUNTER — TELEPHONE (OUTPATIENT)
Dept: OBGYN CLINIC | Facility: CLINIC | Age: 29
End: 2024-02-21

## 2024-02-21 ENCOUNTER — LAB ENCOUNTER (OUTPATIENT)
Dept: LAB | Facility: HOSPITAL | Age: 29
End: 2024-02-21
Attending: OBSTETRICS & GYNECOLOGY
Payer: MEDICAID

## 2024-02-21 ENCOUNTER — OFFICE VISIT (OUTPATIENT)
Dept: OBGYN CLINIC | Facility: CLINIC | Age: 29
End: 2024-02-21
Payer: MEDICAID

## 2024-02-21 VITALS
BODY MASS INDEX: 33 KG/M2 | HEART RATE: 96 BPM | DIASTOLIC BLOOD PRESSURE: 82 MMHG | SYSTOLIC BLOOD PRESSURE: 135 MMHG | WEIGHT: 187.38 LBS

## 2024-02-21 DIAGNOSIS — N92.1 PROLONGED MENSTRUATION: ICD-10-CM

## 2024-02-21 DIAGNOSIS — N92.1 PROLONGED MENSTRUATION: Primary | ICD-10-CM

## 2024-02-21 LAB
BASOPHILS # BLD AUTO: 0.06 X10(3) UL (ref 0–0.2)
BASOPHILS NFR BLD AUTO: 1 %
DEPRECATED RDW RBC AUTO: 39 FL (ref 35.1–46.3)
EOSINOPHIL # BLD AUTO: 0.14 X10(3) UL (ref 0–0.7)
EOSINOPHIL NFR BLD AUTO: 2.2 %
ERYTHROCYTE [DISTWIDTH] IN BLOOD BY AUTOMATED COUNT: 13.7 % (ref 11–15)
EST. AVERAGE GLUCOSE BLD GHB EST-MCNC: 223 MG/DL (ref 68–126)
HBA1C MFR BLD: 9.4 % (ref ?–5.7)
HCT VFR BLD AUTO: 30.8 %
HGB BLD-MCNC: 9.8 G/DL
IMM GRANULOCYTES # BLD AUTO: 0.1 X10(3) UL (ref 0–1)
IMM GRANULOCYTES NFR BLD: 1.6 %
LYMPHOCYTES # BLD AUTO: 1.85 X10(3) UL (ref 1–4)
LYMPHOCYTES NFR BLD AUTO: 29.7 %
MCH RBC QN AUTO: 25 PG (ref 26–34)
MCHC RBC AUTO-ENTMCNC: 31.8 G/DL (ref 31–37)
MCV RBC AUTO: 78.6 FL
MONOCYTES # BLD AUTO: 0.52 X10(3) UL (ref 0.1–1)
MONOCYTES NFR BLD AUTO: 8.3 %
NEUTROPHILS # BLD AUTO: 3.56 X10 (3) UL (ref 1.5–7.7)
NEUTROPHILS # BLD AUTO: 3.56 X10(3) UL (ref 1.5–7.7)
NEUTROPHILS NFR BLD AUTO: 57.2 %
PLATELET # BLD AUTO: 375 10(3)UL (ref 150–450)
RBC # BLD AUTO: 3.92 X10(6)UL
WBC # BLD AUTO: 6.2 X10(3) UL (ref 4–11)

## 2024-02-21 PROCEDURE — 99214 OFFICE O/P EST MOD 30 MIN: CPT | Performed by: OBSTETRICS & GYNECOLOGY

## 2024-02-21 PROCEDURE — 36415 COLL VENOUS BLD VENIPUNCTURE: CPT

## 2024-02-21 PROCEDURE — 83036 HEMOGLOBIN GLYCOSYLATED A1C: CPT

## 2024-02-21 PROCEDURE — 85025 COMPLETE CBC W/AUTO DIFF WBC: CPT

## 2024-02-21 RX ORDER — MISOPROSTOL 200 UG/1
200 TABLET ORAL
Qty: 1 TABLET | Refills: 0 | Status: SHIPPED | OUTPATIENT
Start: 2024-02-21 | End: 2024-02-21

## 2024-02-21 RX ORDER — MEDROXYPROGESTERONE ACETATE 10 MG/1
10 TABLET ORAL DAILY
Qty: 40 TABLET | Refills: 1 | Status: SHIPPED | OUTPATIENT
Start: 2024-02-21

## 2024-02-21 NOTE — TELEPHONE ENCOUNTER
Spoke to pt. Aware surgery is scheduled on Wed,3/13 at 2pm, cytotec instructions provided    Per [facility] No, PA is needed for surgery    Minor case instructions sent via [employer]    Staff message sent to RN pool to please send Cytotec Rx to [facility] [place] that's on pts chart    Delayed staff message sent to MD to please place pre-op orders    Entered in book and calendar

## 2024-02-21 NOTE — TELEPHONE ENCOUNTER
OB GYN SURGICAL SCHEDULING    Diagnosis: prolonged menstruation    Operative Procedure:  D&C, HSC, Nanci zabala    Side: n/a    Date: ASAP; okay on call    Admission:  Day Surgery    Anesthesia:  General     Bowel Prep:  No    Cytotec (200 mcg night prior):  Yes    Rep needed: yes    IPA / [employer] tubals or hyst:  Consent signed   No    Additional Orders:  Routine Orders    Comments / Orders to Nurse: n/a    Discussed possible complications including but not limited to:  see office notes

## 2024-02-21 NOTE — TELEPHONE ENCOUNTER
Please advise if OK to add on Wed,3/13 at 2pm    OR can not added on Wed,3/6 , Mon,3/11 in PM, Mon,2/26 in PM, nor Thur,2/29 before office

## 2024-02-24 NOTE — PROGRESS NOTES
HPI:  The patient is a 28-year-old female here for follow-up to discuss plan of care.  Patient with ongoing abnormal uterine bleeding.  She was at an  emergency room on 2/10 due to excessive bleeding and did receive 1 unit of blood.  Patient has been on Depo-Provera and we attempted a 10 mg course, 20 mg course and currently on 40 mg daily which has stopped her menstruation.  Patient had pelvic ultrasound performed with results below.  Question of endometrial polyp versus submucosal fibroid versus blood clot in the endometrium.  Here today to discuss ultrasound results and coordinate surgery.    US PELVIS W EV (CPT=76856/02270)    Result Date: 2024  CONCLUSION:   Echogenic process of the fundal endometrium of indeterminate significance.  This may represent layering blood products.  A more focal 4 mm nodular component could represent a partially calcified submucosal fibroid or polyp.  This may be difficult to further characterized with MRI given its small size.   LOCATION:  [id number]   Dictated by (CST): Ami Corcoran MD on 2024 at 12:09 PM     Finalized by (CST): Ami Corcoran MD on 2024 at 12:13 PM          Reviewed medical and surgical history below       OBSTETRICS HISTORY:  OB History    Para Term  AB Living   1 1 0 0 0 0   SAB IAB Ectopic Multiple Live Births   0 0 0 0 0       GYNE HISTORY:  History   Sexual Activity    Sexual activity: Yes    Birth control/ protection: Injection            MEDICAL HISTORY:  Past Medical History:   Diagnosis Date    Anxiety     Depression     Hyperlipidemia     Insomnia 2017    Type 1 diabetes mellitus (HCC)        SURGICAL HISTORY:  Past Surgical History:   Procedure Laterality Date    CONTINUE INSULIN PUMP         SOCIAL HISTORY:  Social History     Socioeconomic History    Marital status: Single     Spouse name: Not on file    Number of children: Not on file    Years of education: Not on file    Highest education level: Not on file   Occupational  History    Not on file   Tobacco Use    Smoking status: Never    Smokeless tobacco: Never   Vaping Use    Vaping Use: Never used   Substance and Sexual Activity    Alcohol use: Yes     Alcohol/week: 0.0 standard drinks of alcohol     Comment: wine - every other weekend    Drug use: Yes     Types: Cannabis     Comment: 4 days a week     Sexual activity: Yes     Birth control/protection: Injection   Other Topics Concern    Caffeine Concern Not Asked    Exercise Not Asked    Seat Belt Not Asked    Special Diet Not Asked    Stress Concern Not Asked    Weight Concern Not Asked   Social History Narrative    Not on file     Social Determinants of Health     Financial Resource Strain: Not on file   Food Insecurity: Not on file   Transportation Needs: Not on file   Physical Activity: Not on file   Stress: Not on file   Social Connections: Not on file   Housing Stability: Not on file       FAMILY HISTORY:  Family History   Problem Relation Age of Onset    Cancer Maternal Grandmother     Diabetes Sister     Diabetes Brother     Other (Other) Mother     Diabetes Brother     Diabetes Paternal Grandfather        MEDICATIONS:    Current Outpatient Medications:     medroxyPROGESTERone Acetate (PROVERA) 10 MG Oral Tab, Take 1 tablet (10 mg total) by mouth daily. Take 40mg (4 tablets) daily until surgery, Disp: 40 tablet, Rfl: 1    medroxyPROGESTERone Acetate (PROVERA) 10 MG Oral Tab, Take 2 tablets (20 mg total) by mouth daily., Disp: 20 tablet, Rfl: 0    insulin glargine 100 UNIT/ML Subcutaneous Solution Pen-injector, Inject 30 Units into the skin. Every morning and at night   Basaglar, Disp: , Rfl:     Insulin Lispro 100 UNIT/ML Subcutaneous Solution, Inject 15 Units into the skin nightly., Disp: , Rfl:     Insulin Lispro (HUMALOG) 100 UNIT/ML Subcutaneous Solution Cartridge, Inject 20 Units into the skin 3 (three) times daily before meals. (Patient taking differently: Inject 25 Units into the skin every morning. Admelog), Disp:  10 mL, Rfl: 0    Glucose Blood (ACCU-CHEK GUIDE) In Vitro Strip, 1 strip by In Vitro route 3 (three) times daily before meals., Disp: 50 strip, Rfl: 3    ACCU-CHEK FASTCLIX LANCETS Does not apply Misc, Test blood 3 times daily before meals and for signs, symptoms of hypoglycemia., Disp: 50 each, Rfl: 3    Blood Glucose Monitoring Suppl (ACCU-CHEK GUIDE) w/Device Does not apply Kit, 1 kit by Does not apply route 3 (three) times daily before meals., Disp: 1 kit, Rfl: 0    Insulin Pen Needle 32G X 4 MM Does not apply Misc, 3 times daily with humalog, Disp: 100 each, Rfl: 0    Insulin Syringe 31G X 5/16\" 1 ML Does not apply Misc, 2 times daily with lantus, Disp: 100 each, Rfl: 0    ALLERGIES:    Allergies   Allergen Reactions    Shellfish-Derived Products ANAPHYLAXIS and HIVES         Review of Systems:  Constitutional:  Denies fevers and chills   Cardiovascular:  denies chest pain or palpitations  Respiratory:  denies shortness of breath  Gastrointestinal:  denies heartburn, abdominal pain, diarrhea or constipation  Genitourinary:  denies dysuria, incontinence, abnormal vaginal discharge, vaginal itching  Musculoskeletal:  denies back pain.  Skin/Breast:  Denies any breast pain, lumps, or discharge.   Neurological:  denies headaches, extremity weakness  Psychiatric: denies depression or anxiety.      Vitals:    02/21/24 1017   BP: 135/82   Pulse: 96       PHYSICAL EXAM:   Constitutional: well developed, well nourished  Head/Face: normocephalic  Psychiatric: Appropriate mood and affect  Assessment/Plan:    Batsheva was seen today for gyn problem.    Diagnoses and all orders for this visit:    Prolonged menstruation  -     Hemoglobin A1C; Future  -     CBC W Differential W Platelet; Future  -     medroxyPROGESTERone Acetate (PROVERA) 10 MG Oral Tab; Take 1 tablet (10 mg total) by mouth daily. Take 40mg (4 tablets) daily until surgery        Ultrasound report reviewed with patient.  Question of endometrial polyp versus  submucosal fibroid versus blood clot in the endometrium.  Given prolonged menstruation, anemia requiring blood transfusion would recommend D&C, hysteroscopy and TruClear curettage to remove any endometrial pathology and sample the endometrium to ensure no signs of hyperplasia.  Risks of surgery discussed with patient including bleeding, infection, injury, uterine perforation and need for additional procedures.  Patient would like conception so we will not plan for IUD however if continued heavy bleeding may require a course of birth control to stabilize bleeding in the future.  Will check CBC and A1c now.  Patient has continuous glucose monitor and is on insulin.  Will continue Provera 40 mg daily as this is Dr. Palmer and prepped for surgery.  All questions answered patient voices understanding and agrees to proceed.

## 2024-03-11 NOTE — DISCHARGE INSTRUCTIONS
HOME INSTRUCTIONS  What happens after hysteroscopy?  You may have cramps and bleeding for short time after the procedure. This is normal. Use pads instead of tampons.  Don't douche or use tampons until your healthcare provider says it’s OK.  Don't use any vaginal medicines until you are told it’s OK.  Ask your healthcare provider when it’s OK to have sex again.    When to call your healthcare provider  Call your healthcare provider if any of the following occur:  Heavy bleeding (more than 1 pad an hour for 2 or more hours)  A fever of 100.4°F ( 38.0°C) or higher, or as directed by your provider  Increasing belly (abdominal) pain or soreness  Bad-smelling discharge  Follow-up care  Schedule a follow-up visit with your healthcare provider. Based on your test results, you may need more treatment. Be sure to follow directions and keep your appointments.  AMBSURG HOME CARE INSTRUCTIONS: POST-OP ANESTHESIA  The medication that you received for sedation or general anesthesia can last up to 24 hours. Your judgment and reflexes may be altered, even if you feel like your normal self.      We Recommend:   Do not drive any motor vehicle or bicycle   Avoid mowing the lawn, playing sports, or working with power tools/applicances (power saws, electric knives or mixers)   That you have someone stay with you on your first night home   Do not drink alcohol or take sleeping pills or tranquilizers   Do not sign legal documents within 24 hours of your procedure   If you had a nerve block for your surgery, take extra care not to put any pressure on your arm or hand for 24 hours    It is normal:  For you to have a sore throat if you had a breathing tube during surgery (while you were asleep!). The sore throat should get better within 48 hours. You can gargle with warm salt water (1/2 tsp in 4 oz warm water) or use a throat lozenge for comfort  To feel muscle aches or soreness especially in the abdomen, chest or neck. The achy feeling  should go away in the next 24 hours  To feel weak, sleepy or \"wiped out\". Your should start feeling better in the next 24 hours.   To experience mild discomforts such as sore lip or tongue, headache, cramps, gas pains or a bloated feeling in your abdomen.   To experience mild back pain or soreness for a day or two if you had spinal or epidural anesthesia.   If you had laparoscopic surgery, to feel shoulder pain or discomfort on the day of surgery.   For some patients to have nausea after surgery/anesthesia    If you feel nausea or experience vomiting:   Try to move around less.   Eat less than usual or drink only liquids until the next morning   Nausea should resolve in about 24 hours    If you have a problem when you are at home:    Call your surgeons office   Discharge Instructions: After Your Surgery  You’ve just had surgery. During surgery, you were given medicine called anesthesia to keep you relaxed and free of pain. After surgery, you may have some pain or nausea. This is common. Here are some tips for feeling better and getting well after surgery.   Going home  Your healthcare provider will show you how to take care of yourself when you go home. They'll also answer your questions. Have an adult family member or friend drive you home. For the first 24 hours after your surgery:   Don't drive or use heavy equipment.  Don't make important decisions or sign legal papers.  Take medicines as directed.  Don't drink alcohol.  Have someone stay with you, if needed. They can watch for problems and help keep you safe.  Be sure to go to all follow-up visits with your healthcare provider. And rest after your surgery for as long as your provider tells you to.   Coping with pain  If you have pain after surgery, pain medicine will help you feel better. Take it as directed, before pain becomes severe. Also, ask your healthcare provider or pharmacist about other ways to control pain. This might be with heat, ice, or  relaxation. And follow any other instructions your surgeon or nurse gives you.      Stay on schedule with your medicine.     Tips for taking pain medicine  To get the best relief possible, remember these points:   Pain medicines can upset your stomach. Taking them with a little food may help.  Most pain relievers taken by mouth need at least 20 to 30 minutes to start to work.  Don't wait till your pain becomes severe before you take your medicine. Try to time your medicine so that you can take it before starting an activity. This might be before you get dressed, go for a walk, or sit down for dinner.  Constipation is a common side effect of some pain medicines. Call your healthcare provider before taking any medicines such as laxatives or stool softeners to help ease constipation. Also ask if you should skip any foods. Drinking lots of fluids and eating foods such as fruits and vegetables that are high in fiber can also help. Remember, don't take laxatives unless your surgeon has prescribed them.  Drinking alcohol and taking pain medicine can cause dizziness and slow your breathing. It can even be deadly. Don't drink alcohol while taking pain medicine.  Pain medicine can make you react more slowly to things. Don't drive or run machinery while taking pain medicine.  Your healthcare provider may tell you to take acetaminophen to help ease your pain. Ask them how much you're supposed to take each day. Acetaminophen or other pain relievers may interact with your prescription medicines or other over-the-counter (OTC) medicines. Some prescription medicines have acetaminophen and other ingredients in them. Using both prescription and OTC acetaminophen for pain can cause you to accidentally overdose. Read the labels on your OTC medicines with care. This will help you to clearly know the list of ingredients, how much to take, and any warnings. It may also help you not take too much acetaminophen. If you have questions or  don't understand the information, ask your pharmacist or healthcare provider to explain it to you before you take the OTC medicine.   Managing nausea  Some people have an upset stomach (nausea) after surgery. This is often because of anesthesia, pain, or pain medicine, less movement of food in the stomach, or the stress of surgery. These tips will help you handle nausea and eat healthy foods as you get better. If you were on a special food plan before surgery, ask your healthcare provider if you should follow it while you get better. Check with your provider on how your eating should progress. It may depend on the surgery you had. These general tips may help:   Don't push yourself to eat. Your body will tell you when to eat and how much.  Start off with clear liquids and soup. They're easier to digest.  Next try semi-solid foods as you feel ready. These include mashed potatoes, applesauce, and gelatin.  Slowly move to solid foods. Don’t eat fatty, rich, or spicy foods at first.  Don't force yourself to have 3 large meals a day. Instead eat smaller amounts more often.  Take pain medicines with a small amount of solid food, such as crackers or toast. This helps prevent nausea.  When to call your healthcare provider  Call your healthcare provider right away if you have any of these:   You still have too much pain, or the pain gets worse, after taking the medicine. The medicine may not be strong enough. Or there may be a complication from the surgery.  You feel too sleepy, dizzy, or groggy. The medicine may be too strong.  Side effects such as nausea or vomiting. Your healthcare provider may advise taking other medicines to .  Skin changes such as rash, itching, or hives. This may mean you have an allergic reaction. Your provider may advise taking other medicines.  The incision looks different (for instance, part of it opens up).  Bleeding or fluid leaking from the incision site, and weren't told to expect that.  Fever  of 100.4°F (38°C) or higher, or as directed by your provider.  Call 911  Call 911 right away if you have:   Trouble breathing  Facial swelling    If you have obstructive sleep apnea   You were given anesthesia medicine during surgery to keep you comfortable and free of pain. After surgery, you may have more apnea spells because of this medicine and other medicines you were given. The spells may last longer than normal.    At home:  Keep using the continuous positive airway pressure (CPAP) device when you sleep. Unless your healthcare provider tells you not to, use it when you sleep, day or night. CPAP is a common device used to treat obstructive sleep apnea.  Talk with your provider before taking any pain medicine, muscle relaxants, or sedatives. Your provider will tell you about the possible dangers of taking these medicines.  Contact your provider if your sleeping changes a lot even when taking medicines as directed.  [employer] last reviewed this educational content on 10/1/2021  © 5691-7004 [employer]. All rights reserved. This information is not intended as a substitute for professional medical care. Always follow your healthcare professional's instructions.

## 2024-03-13 ENCOUNTER — ANESTHESIA (OUTPATIENT)
Dept: SURGERY | Facility: HOSPITAL | Age: 29
End: 2024-03-13
Payer: MEDICAID

## 2024-03-13 ENCOUNTER — HOSPITAL ENCOUNTER (OUTPATIENT)
Facility: HOSPITAL | Age: 29
Setting detail: HOSPITAL OUTPATIENT SURGERY
Discharge: HOME | End: 2024-03-13
Attending: OBSTETRICS & GYNECOLOGY | Admitting: OBSTETRICS & GYNECOLOGY
Payer: MEDICAID

## 2024-03-13 ENCOUNTER — ANESTHESIA EVENT (OUTPATIENT)
Dept: SURGERY | Facility: HOSPITAL | Age: 29
End: 2024-03-13
Payer: MEDICAID

## 2024-03-13 VITALS
SYSTOLIC BLOOD PRESSURE: 144 MMHG | BODY MASS INDEX: 32.43 KG/M2 | HEART RATE: 76 BPM | HEIGHT: 63 IN | WEIGHT: 183 LBS | RESPIRATION RATE: 18 BRPM | TEMPERATURE: 98 F | DIASTOLIC BLOOD PRESSURE: 82 MMHG | OXYGEN SATURATION: 96 %

## 2024-03-13 DIAGNOSIS — N92.1 PROLONGED MENSTRUATION: ICD-10-CM

## 2024-03-13 LAB
B-HCG UR QL: NEGATIVE
GLUCOSE BLDC GLUCOMTR-MCNC: 135 MG/DL (ref 70–99)
GLUCOSE BLDC GLUCOMTR-MCNC: 161 MG/DL (ref 70–99)

## 2024-03-13 PROCEDURE — 58558 HYSTEROSCOPY BIOPSY: CPT | Performed by: OBSTETRICS & GYNECOLOGY

## 2024-03-13 PROCEDURE — 0UB98ZZ EXCISION OF UTERUS, VIA NATURAL OR ARTIFICIAL OPENING ENDOSCOPIC: ICD-10-PCS | Performed by: OBSTETRICS & GYNECOLOGY

## 2024-03-13 RX ORDER — HYDROCODONE BITARTRATE AND ACETAMINOPHEN 5; 325 MG/1; MG/1
2 TABLET ORAL EVERY 6 HOURS PRN
Status: CANCELLED | OUTPATIENT
Start: 2024-03-13

## 2024-03-13 RX ORDER — FAMOTIDINE 10 MG/ML
20 INJECTION, SOLUTION INTRAVENOUS ONCE
Status: COMPLETED | OUTPATIENT
Start: 2024-03-13 | End: 2024-03-13

## 2024-03-13 RX ORDER — NICOTINE POLACRILEX 4 MG
15 LOZENGE BUCCAL
Status: DISCONTINUED | OUTPATIENT
Start: 2024-03-13 | End: 2024-03-13

## 2024-03-13 RX ORDER — FAMOTIDINE 20 MG/1
20 TABLET, FILM COATED ORAL ONCE
Status: COMPLETED | OUTPATIENT
Start: 2024-03-13 | End: 2024-03-13

## 2024-03-13 RX ORDER — MIDAZOLAM HYDROCHLORIDE 1 MG/ML
INJECTION INTRAMUSCULAR; INTRAVENOUS AS NEEDED
Status: DISCONTINUED | OUTPATIENT
Start: 2024-03-13 | End: 2024-03-13 | Stop reason: SURG

## 2024-03-13 RX ORDER — ONDANSETRON 2 MG/ML
4 INJECTION INTRAMUSCULAR; INTRAVENOUS EVERY 6 HOURS PRN
Status: DISCONTINUED | OUTPATIENT
Start: 2024-03-13 | End: 2024-03-13

## 2024-03-13 RX ORDER — ONDANSETRON 2 MG/ML
4 INJECTION INTRAMUSCULAR; INTRAVENOUS ONCE
Status: DISCONTINUED | OUTPATIENT
Start: 2024-03-13 | End: 2024-03-13

## 2024-03-13 RX ORDER — KETOROLAC TROMETHAMINE 30 MG/ML
INJECTION, SOLUTION INTRAMUSCULAR; INTRAVENOUS AS NEEDED
Status: DISCONTINUED | OUTPATIENT
Start: 2024-03-13 | End: 2024-03-13 | Stop reason: SURG

## 2024-03-13 RX ORDER — ONDANSETRON 2 MG/ML
4 INJECTION INTRAMUSCULAR; INTRAVENOUS EVERY 8 HOURS PRN
Status: CANCELLED | OUTPATIENT
Start: 2024-03-13

## 2024-03-13 RX ORDER — SODIUM CHLORIDE, SODIUM LACTATE, POTASSIUM CHLORIDE, CALCIUM CHLORIDE 600; 310; 30; 20 MG/100ML; MG/100ML; MG/100ML; MG/100ML
INJECTION, SOLUTION INTRAVENOUS CONTINUOUS
Status: DISCONTINUED | OUTPATIENT
Start: 2024-03-13 | End: 2024-03-13

## 2024-03-13 RX ORDER — HYDROMORPHONE HYDROCHLORIDE 1 MG/ML
0.4 INJECTION, SOLUTION INTRAMUSCULAR; INTRAVENOUS; SUBCUTANEOUS EVERY 5 MIN PRN
Status: DISCONTINUED | OUTPATIENT
Start: 2024-03-13 | End: 2024-03-13

## 2024-03-13 RX ORDER — LIDOCAINE HYDROCHLORIDE 10 MG/ML
INJECTION, SOLUTION EPIDURAL; INFILTRATION; INTRACAUDAL; PERINEURAL AS NEEDED
Status: DISCONTINUED | OUTPATIENT
Start: 2024-03-13 | End: 2024-03-13 | Stop reason: SURG

## 2024-03-13 RX ORDER — HYDROCODONE BITARTRATE AND ACETAMINOPHEN 5; 325 MG/1; MG/1
1 TABLET ORAL EVERY 6 HOURS PRN
Status: CANCELLED | OUTPATIENT
Start: 2024-03-13

## 2024-03-13 RX ORDER — MORPHINE SULFATE 4 MG/ML
4 INJECTION, SOLUTION INTRAMUSCULAR; INTRAVENOUS EVERY 10 MIN PRN
Status: DISCONTINUED | OUTPATIENT
Start: 2024-03-13 | End: 2024-03-13

## 2024-03-13 RX ORDER — HYDROMORPHONE HYDROCHLORIDE 1 MG/ML
0.6 INJECTION, SOLUTION INTRAMUSCULAR; INTRAVENOUS; SUBCUTANEOUS EVERY 5 MIN PRN
Status: DISCONTINUED | OUTPATIENT
Start: 2024-03-13 | End: 2024-03-13

## 2024-03-13 RX ORDER — MORPHINE SULFATE 4 MG/ML
2 INJECTION, SOLUTION INTRAMUSCULAR; INTRAVENOUS EVERY 10 MIN PRN
Status: DISCONTINUED | OUTPATIENT
Start: 2024-03-13 | End: 2024-03-13

## 2024-03-13 RX ORDER — NICOTINE POLACRILEX 4 MG
30 LOZENGE BUCCAL
Status: DISCONTINUED | OUTPATIENT
Start: 2024-03-13 | End: 2024-03-13

## 2024-03-13 RX ORDER — ACETAMINOPHEN 325 MG/1
650 TABLET ORAL EVERY 6 HOURS PRN
Status: CANCELLED | OUTPATIENT
Start: 2024-03-13

## 2024-03-13 RX ORDER — METOCLOPRAMIDE 10 MG/1
10 TABLET ORAL ONCE
Status: COMPLETED | OUTPATIENT
Start: 2024-03-13 | End: 2024-03-13

## 2024-03-13 RX ORDER — NALOXONE HYDROCHLORIDE 0.4 MG/ML
0.08 INJECTION, SOLUTION INTRAMUSCULAR; INTRAVENOUS; SUBCUTANEOUS AS NEEDED
Status: DISCONTINUED | OUTPATIENT
Start: 2024-03-13 | End: 2024-03-13

## 2024-03-13 RX ORDER — HYDROMORPHONE HYDROCHLORIDE 1 MG/ML
0.2 INJECTION, SOLUTION INTRAMUSCULAR; INTRAVENOUS; SUBCUTANEOUS EVERY 5 MIN PRN
Status: DISCONTINUED | OUTPATIENT
Start: 2024-03-13 | End: 2024-03-13

## 2024-03-13 RX ORDER — DEXTROSE MONOHYDRATE 25 G/50ML
50 INJECTION, SOLUTION INTRAVENOUS
Status: DISCONTINUED | OUTPATIENT
Start: 2024-03-13 | End: 2024-03-13

## 2024-03-13 RX ORDER — ACETAMINOPHEN 500 MG
1000 TABLET ORAL ONCE
Status: COMPLETED | OUTPATIENT
Start: 2024-03-13 | End: 2024-03-13

## 2024-03-13 RX ORDER — ONDANSETRON 4 MG/1
4 TABLET, FILM COATED ORAL EVERY 8 HOURS PRN
Status: CANCELLED | OUTPATIENT
Start: 2024-03-13

## 2024-03-13 RX ORDER — MORPHINE SULFATE 10 MG/ML
6 INJECTION, SOLUTION INTRAMUSCULAR; INTRAVENOUS EVERY 10 MIN PRN
Status: DISCONTINUED | OUTPATIENT
Start: 2024-03-13 | End: 2024-03-13

## 2024-03-13 RX ORDER — METOCLOPRAMIDE HYDROCHLORIDE 5 MG/ML
10 INJECTION INTRAMUSCULAR; INTRAVENOUS ONCE
Status: COMPLETED | OUTPATIENT
Start: 2024-03-13 | End: 2024-03-13

## 2024-03-13 RX ORDER — ONDANSETRON 2 MG/ML
INJECTION INTRAMUSCULAR; INTRAVENOUS AS NEEDED
Status: DISCONTINUED | OUTPATIENT
Start: 2024-03-13 | End: 2024-03-13 | Stop reason: SURG

## 2024-03-13 RX ADMIN — SODIUM CHLORIDE, SODIUM LACTATE, POTASSIUM CHLORIDE, CALCIUM CHLORIDE: 600; 310; 30; 20 INJECTION, SOLUTION INTRAVENOUS at 13:32:00

## 2024-03-13 RX ADMIN — MIDAZOLAM HYDROCHLORIDE 2 MG: 1 INJECTION INTRAMUSCULAR; INTRAVENOUS at 13:03:00

## 2024-03-13 RX ADMIN — ONDANSETRON 4 MG: 2 INJECTION INTRAMUSCULAR; INTRAVENOUS at 13:08:00

## 2024-03-13 RX ADMIN — SODIUM CHLORIDE, SODIUM LACTATE, POTASSIUM CHLORIDE, CALCIUM CHLORIDE: 600; 310; 30; 20 INJECTION, SOLUTION INTRAVENOUS at 13:08:00

## 2024-03-13 RX ADMIN — LIDOCAINE HYDROCHLORIDE 50 MG: 10 INJECTION, SOLUTION EPIDURAL; INFILTRATION; INTRACAUDAL; PERINEURAL at 13:08:00

## 2024-03-13 RX ADMIN — KETOROLAC TROMETHAMINE 15 MG: 30 INJECTION, SOLUTION INTRAMUSCULAR; INTRAVENOUS at 13:34:00

## 2024-03-13 NOTE — H&P
[facility]  part of Located within [facility]    History & Physical    Batsheva Marie Patient Status:  Hospital Outpatient Surgery    1995 MRN [id number]   Location [facility] PRE OP RECOVERY Attending Sundar Ramirez, DO   Hosp Day # 0 PCP Britney Watts, BRI     Date of Admission:  3/13/2024    SUBJECTIVE:    Batsheva Marie is a 28 year old  here for D&C/HSC/truclear currettage for prolonged menstruation. Pt has been on provera 40mg daily due to ongoing bleeding issues that lower doses of 10 and 20mg were unable to control.  US below shows endometrial lesion (blood clot vs polyp vs fibroid).  Plan for [facility] today to evaluate endometrium and resection any lesions present.  Of additional importance, pt interested in conception, so HSC important to evaluate endometrium as well.          US PELVIS W EV (CPT=76856/65947)    Result Date: 2024  CONCLUSION:   Echogenic process of the fundal endometrium of indeterminate significance.  This may represent layering blood products.  A more focal 4 mm nodular component could represent a partially calcified submucosal fibroid or polyp.  This may be difficult to further characterized with MRI given its small size.   LOCATION:  [id number]   Dictated by (CST): Ami Corcoran MD on 2024 at 12:09 PM     Finalized by (CST): Ami Corcoran MD on 2024 at 12:13 PM       CT CHEST PE AORTA (IV ONLY) (CPT=71260)    Result Date: 2024  CONCLUSION: No PE or acute aortic syndrome    Dictated by (CST): Abhi Morris MD on 2024 at 2:12 PM     Finalized by (CST): Abhi Morris MD on 2024 at 2:16 PM               Lab Results   Component Value Date    ABO BLOOD TYPE O 2024    RH BLOOD TYPE Positive 2024    HGB 9.8 (L) 2024    .0 2024    HCT 30.8 (L) 2024          Problem List:   Patient Active Problem List    Diagnosis    Type 1 diabetes mellitus with ketoacidosis without coma (HCC)     Anxiety    Uncontrolled type 1 diabetes mellitus    Diabetic ketoacidosis without coma associated with diabetes mellitus due to underlying condition (HCC)    Hyperglycemia    Insulin dependent diabetes mellitus    Epigastric pain    Diabetic ketoacidosis without coma associated with type 1 diabetes mellitus (HCC)    Hyponatremia    Transaminitis     Obstetric History:   OB History    Para Term  AB Living   1 1           SAB IAB Ectopic Multiple Live Births                  # Outcome Date GA Lbr Ernie/2nd Weight Sex Delivery Anes PTL Lv   1 Para 19 19w2d  7.4 oz (0.209 kg)  Vag-Spont EPI N FD     Past Medical History:   Past Medical History:   Diagnosis Date    Anxiety     Calculus of kidney     Depression     History of blood transfusion     no reactions    Hyperlipidemia     Insomnia 2017    Pneumonia due to organism     Seizure disorder (HCC)     last episode     Type 1 diabetes mellitus (HCC)     Visual impairment      Past Social History:   Past Surgical History:   Procedure Laterality Date    CONTINUE INSULIN PUMP       Family History:   Family History   Problem Relation Age of Onset    Other (Other) Mother     Cancer Maternal Grandmother     Diabetes Paternal Grandfather     Diabetes Sister     Diabetes Brother     Diabetes Brother      Social History:   Social History     Tobacco Use    Smoking status: Never    Smokeless tobacco: Never   Substance Use Topics    Alcohol use: Yes     Alcohol/week: 0.0 standard drinks of alcohol     Comment: wine - every other weekend       Home Meds:   Medications Prior to Admission   Medication Sig Dispense Refill Last Dose    Insulin Pump Accessories Does not apply Misc        miSOPROStol (CYTOTEC) 200 MCG Oral Tab Take 1 tablet (200 mcg total) by mouth 1 (one) time if needed (take the night before surgery). 1 tablet 0 3/12/2024 at 2030    [] medroxyPROGESTERone Acetate 10 MG Oral Tab Take 4 tablets (40 mg total) by mouth daily for 10 days. 40  tablet 0 3/8/2024    medroxyPROGESTERone Acetate (PROVERA) 10 MG Oral Tab Take 1 tablet (10 mg total) by mouth daily. Take 40mg (4 tablets) daily until surgery 40 tablet 1 3/11/2024    medroxyPROGESTERone Acetate (PROVERA) 10 MG Oral Tab Take 2 tablets (20 mg total) by mouth daily. 20 tablet 0     Glucose Blood (ACCU-CHEK GUIDE) In Vitro Strip 1 strip by In Vitro route 3 (three) times daily before meals. 50 strip 3     ACCU-CHEK FASTCLIX LANCETS Does not apply Misc Test blood 3 times daily before meals and for signs, symptoms of hypoglycemia. 50 each 3     Blood Glucose Monitoring Suppl (ACCU-CHEK GUIDE) w/Device Does not apply Kit 1 kit by Does not apply route 3 (three) times daily before meals. 1 kit 0      Allergies:   Allergies   Allergen Reactions    Shellfish-Derived Products ANAPHYLAXIS and HIVES       OBJECTIVE:    Temp:  [97.8 °F (36.6 °C)] 97.8 °F (36.6 °C)  Pulse:  [97] 97  Resp:  [17] 17  BP: (141)/(86) 141/86  SpO2:  [100 %] 100 %    General: Alert and Oriented  Chest: CTAB, RRR  Abd: soft, nt, nd  Pelvic: defer to OR    Lab Review:  Results for orders placed or performed during the hospital encounter of 03/13/24   POCT Pregnancy, Urine   Result Value Ref Range    POCT Urine Pregnancy Negative Negative   POCT Glucose   Result Value Ref Range    POC Glucose  135 (H) 70 - 99 mg/dL     Plan for D&C, HSC, Truclear currettage    Risks of surgery including bleeding, infection, injury, uterine perforation and need for additional procedures all d/w pt.  NPO, IVFs, consents, anesthesia to see, SCDs.  No abx warranted.  UPT neg and BS wnl.  All questions answered.  Pt voices understanding and agrees to proceed.      Sundar Ramirez,   3/13/2024  12:45 PM

## 2024-03-13 NOTE — ANESTHESIA POSTPROCEDURE EVALUATION
Patient: Batsheva Marie    Procedure Summary       Date: 03/13/24 Room / Location: [facility] MAIN OR 08 / [facility] MAIN OR    Anesthesia Start: 1303 Anesthesia Stop: 1355    Procedure: Hysteroscopy, dilation and curettage, Truclear polypectomy (Vagina ) Diagnosis:       Prolonged menstruation      (Prolonged menstruation [N92.1])    Surgeons: Sundar Ramirez DO Anesthesiologist: Ac Palmer MD    Anesthesia Type: general ASA Status: 3            Anesthesia Type: general    Vitals Value Taken Time   /88 03/13/24 1355   Temp 97.1 °F (36.2 °C) 03/13/24 1355   Pulse 95 03/13/24 1355   Resp 15 03/13/24 1355   SpO2 100 % 03/13/24 1355   Vitals shown include unfiled device data.    EM AN Post Evaluation:   Patient Evaluated in PACU  Patient Participation: complete - patient participated  Level of Consciousness: sleepy but conscious  Pain Management: adequate  Airway Patency:patent  Dental exam unchanged from preop  Yes    Nausea/Vomiting: none  Cardiovascular Status: acceptable  Respiratory Status: acceptable and nasal cannula  Postoperative Hydration acceptable      Mignon Novak CRNA  3/13/2024 1:56 PM

## 2024-03-13 NOTE — OR NURSING
Topical iodine/betadine okay per patient. Betadine scrub and paint used for vaginal prep, MD notified.

## 2024-03-13 NOTE — OPERATIVE REPORT
Operative Note    Patient Name: Batsheva Marie    Preoperative Diagnosis: Prolonged menstruation [N92.1]    Postoperative Diagnosis: Prolonged menstruation [N92.1]    Surgeon(s) and Role:     * Sundar Ramirez,  - Primary     Assistant:      Procedures: D&C, Hysteroscopy with Truclear polypectomy and currettings    Surgical Findings: Normal EG, normal vagina and normal cervix.  Uterus sound to 7cm.  Thickened endometrium circumfrentially around cavity with 2 small polyps, each in the cornual region.  There was thickened polypoid tissue on the anterior endometrial wall    Anesthesia: General    Complications: none    Implants: none    Specimen: endometrial polyps with curretings    Drains: none    Condition: stable to recovery    Estimated Blood Loss: 5cc  IVF: 1L crystalloid  UOP: 150cc clear urine  Fluid deficit: 150cc    Procedure:    The patient was taken back to the operating room with IV fluids running.  She was placed supine on the operating table and general anesthesia was induced.  She was sterilely prepped and draped in the dorsolithotomy position in Shamar stirrups.  A timeout was performed indicating the correct patient and the correct procedure.  The bladder was emptied for 150 cc of clear urine.  A bimanual exam was performed showing a midposition uterus.  Normal external genitalia.  Speculum was inserted into the vagina and the single-tooth tenaculum applied to the anterior lip of the cervix.  The uterus was sounded 7 cm.  The cervix was serially dilated to 7 mm to accompany the hysteroscope.  The hysteroscope was introduced into the endometrial cavity.  Upon review of the cavity, it showed circumferential thickened tissue with 2 polyps, 1 each in the cornual region of the endometrium, both of which were small.  There was thickened polypoid-like tissue on the anterior mid body of the endometrial cavity.  At this point the true clear soft mini device was inserted through the  hysteroscope and the polyps were removed.  A circumferential directly visualized curettage was performed with the TruClear thereafter.  After this, the device was removed.  All instruments were then removed from the uterus, cervix and vagina.  The tenaculum sites were noted to be hemostatic.  The patient was then cleaned up, transferred to the recovery room after being awoken from anesthesia.  No complications arose.  Surgical findings were discussed with the patient's spouse at the end of the procedure.    Sundar Ramirez DO

## 2024-03-13 NOTE — ANESTHESIA PREPROCEDURE EVALUATION
Anesthesia PreOp Note    HPI:     Batsheva Marie is a 28 year old female who presents for preoperative consultation requested by: Sundar Ramirez DO    Date of Surgery: 3/13/2024    Procedure(s):  Hysteroscopy, dilation and curettage, Truclear curettage  Indication: Prolonged menstruation [N92.1]    Relevant Problems   No relevant active problems       NPO:  Last Liquid Consumption Date: 24  Last Liquid Consumption Time: 0730 (cranberry juice)  Last Solid Consumption Date: 24  Last Solid Consumption Time: 2230  Last Liquid Consumption Date: 24          History Review:  Patient Active Problem List    Diagnosis Date Noted    Type 1 diabetes mellitus with ketoacidosis without coma (HCC) 04/15/2018    Anxiety     Uncontrolled type 1 diabetes mellitus 2017    Diabetic ketoacidosis without coma associated with diabetes mellitus due to underlying condition (HCC)     Hyperglycemia 2017    Insulin dependent diabetes mellitus 2017    Epigastric pain 2017    Diabetic ketoacidosis without coma associated with type 1 diabetes mellitus (HCC)     Hyponatremia     Transaminitis        Past Medical History:   Diagnosis Date    Anxiety     Calculus of kidney     Depression     History of blood transfusion     no reactions    Hyperlipidemia     Insomnia 2017    Pneumonia due to organism     Seizure disorder (HCC)     last episode     Type 1 diabetes mellitus (HCC)     Visual impairment        Past Surgical History:   Procedure Laterality Date    CONTINUE INSULIN PUMP         Medications Prior to Admission   Medication Sig Dispense Refill Last Dose    [] medroxyPROGESTERone Acetate 10 MG Oral Tab Take 4 tablets (40 mg total) by mouth daily for 10 days. 40 tablet 0 3/8/2024    medroxyPROGESTERone Acetate (PROVERA) 10 MG Oral Tab Take 1 tablet (10 mg total) by mouth daily. Take 40mg (4 tablets) daily until surgery 40 tablet 1 3/11/2024    [] miSOPROStol  (CYTOTEC) 200 MCG Oral Tab Take 1 tablet (200 mcg total) by mouth 1 (one) time if needed (take the night before surgery). 1 tablet 0     medroxyPROGESTERone Acetate (PROVERA) 10 MG Oral Tab Take 2 tablets (20 mg total) by mouth daily. 20 tablet 0     Glucose Blood (ACCU-CHEK GUIDE) In Vitro Strip 1 strip by In Vitro route 3 (three) times daily before meals. 50 strip 3     ACCU-CHEK FASTCLIX LANCETS Does not apply Misc Test blood 3 times daily before meals and for signs, symptoms of hypoglycemia. 50 each 3     Blood Glucose Monitoring Suppl (ACCU-CHEK GUIDE) w/Device Does not apply Kit 1 kit by Does not apply route 3 (three) times daily before meals. 1 kit 0      No current [facility]-ordered facility-administered medications on file.     No current Epic-ordered outpatient medications on file.       Allergies   Allergen Reactions    Shellfish-Derived Products ANAPHYLAXIS and HIVES       Family History   Problem Relation Age of Onset    Other (Other) Mother     Cancer Maternal Grandmother     Diabetes Paternal Grandfather     Diabetes Sister     Diabetes Brother     Diabetes Brother      Social History     Socioeconomic History    Marital status: Single   Tobacco Use    Smoking status: Never    Smokeless tobacco: Never   Vaping Use    Vaping Use: Never used   Substance and Sexual Activity    Alcohol use: Yes     Alcohol/week: 0.0 standard drinks of alcohol     Comment: wine - every other weekend    Drug use: Not Currently     Types: Cannabis    Sexual activity: Yes     Birth control/protection: Injection       Available pre-op labs reviewed.  Lab Results   Component Value Date    WBC 6.2 02/21/2024    RBC 3.92 02/21/2024    HGB 9.8 (L) 02/21/2024    HCT 30.8 (L) 02/21/2024    MCV 78.6 (L) 02/21/2024    MCH 25.0 (L) 02/21/2024    MCHC 31.8 02/21/2024    RDW 13.7 02/21/2024    .0 02/21/2024    PREGS Negative 01/04/2024    URINEPREG Negative 03/13/2024     Lab Results   Component Value Date     01/24/2024    K 4.2  01/24/2024     01/24/2024    CO2 24.0 01/24/2024    BUN 18 01/24/2024    CREATSERUM 1.31 (H) 01/24/2024     (H) 01/24/2024    CA 9.3 01/24/2024          Vital Signs:  Body mass index is 31.89 kg/m².   height is 1.6 m (5' 3\") and weight is 81.6 kg (180 lb).   Vitals:    03/08/24 1559   Weight: 81.6 kg (180 lb)   Height: 1.6 m (5' 3\")        Anesthesia Evaluation     Patient summary reviewed and Nursing notes reviewed    No history of anesthetic complications   Airway   Mallampati: II  TM distance: <3 FB  Neck ROM: full  Dental - Dentition appears grossly intact     Pulmonary - normal exam   Cardiovascular - negative ROS and normal exam  Exercise tolerance: good    Neuro/Psych    (+)  seizures (in the setting of DKA, seizure free since 2020, was never on anti-epileptics), anxiety/panic attacks,  depression      GI/Hepatic/Renal - negative ROS     Endo/Other    (+) diabetes mellitus (a1c 9.4. CGM and insulin pump in place - CGM removed for surgery) type 1 poorly controlled using insulin  Abdominal                  Anesthesia Plan:   ASA:  3  Plan:   General  Airway:  LMA  Post-op Pain Management: Oral pain medication and IV analgesics  Informed Consent Plan and Risks Discussed With:  Patient  Discussed plan with:  CRNA      I have informed Batsheva Marie and/or legal guardian or family member of the nature of the anesthetic plan, benefits, risks including possible dental damage if relevant, major complications, and any alternative forms of anesthetic management.   All of the patient's questions were answered to the best of my ability. The patient desires the anesthetic management as planned.  Ac Palmer MD  3/13/2024 11:54 AM  Present on Admission:  **None**

## 2024-03-13 NOTE — ANESTHESIA PROCEDURE NOTES
Airway  Date/Time: 3/13/2024 1:10 PM  Urgency: elective    Airway not difficult    General Information and Staff    Patient location during procedure: OR  Resident/CRNA: Mignon Novak CRNA  Performed: CRNA   Performed by: Mignon Novak CRNA  Authorized by: Ac Palmer MD      Indications and Patient Condition  Indications for airway management: airway protection and anesthesia  Preoxygenated: yes  Mask difficulty assessment: 0 - not attempted    Final Airway Details  Final airway type: supraglottic airway      Successful airway: Size 4       Number of attempts at approach: 1

## 2024-03-22 ENCOUNTER — TELEPHONE (OUTPATIENT)
Dept: OBGYN CLINIC | Facility: CLINIC | Age: 29
End: 2024-03-22

## 2024-03-22 RX ORDER — VALACYCLOVIR HYDROCHLORIDE 500 MG/1
500 TABLET, FILM COATED ORAL 2 TIMES DAILY
Qty: 60 TABLET | Refills: 0 | Status: SHIPPED | OUTPATIENT
Start: 2024-03-22 | End: 2024-03-25

## 2024-03-22 NOTE — TELEPHONE ENCOUNTER
Pt having herpes flare up and needs a refill.    Pls advise  Confirmed pharmacy [facility] [id number]

## 2024-03-22 NOTE — TELEPHONE ENCOUNTER
Pt is pt of [facility]. Last annual: 1/4/24  Pap 9/28/21, results Negative for Intraepithelial Lesion or Malignancy  Pt reports she is having a Herpes flare up. Pt has not had 6 or more outbreaks in the past year.  She is asking for refill of meds. To [facility] on-call in [facility] absence. OK to refill Rx? Pt confirms her pharmacy as the [facility] in [place] on [address].    Spoke to [facility] on-call. Per [facility], ok to send Rx for refill. Pr pt's request, pt informed via [facility].

## 2024-05-09 ENCOUNTER — OFFICE VISIT (OUTPATIENT)
Facility: LOCATION | Age: 29
End: 2024-05-09

## 2024-05-09 VITALS
WEIGHT: 181 LBS | HEIGHT: 63 IN | OXYGEN SATURATION: 98 % | DIASTOLIC BLOOD PRESSURE: 72 MMHG | SYSTOLIC BLOOD PRESSURE: 130 MMHG | HEART RATE: 95 BPM | BODY MASS INDEX: 32.07 KG/M2

## 2024-05-09 DIAGNOSIS — E10.10 DIABETIC KETOACIDOSIS WITHOUT COMA ASSOCIATED WITH TYPE 1 DIABETES MELLITUS (HCC): ICD-10-CM

## 2024-05-09 DIAGNOSIS — E10.10 TYPE 1 DIABETES MELLITUS WITH KETOACIDOSIS WITHOUT COMA (HCC): Primary | ICD-10-CM

## 2024-05-09 DIAGNOSIS — R73.09 HIGH GLUCOSE LEVEL: ICD-10-CM

## 2024-05-09 DIAGNOSIS — E08.10 DIABETIC KETOACIDOSIS WITHOUT COMA ASSOCIATED WITH DIABETES MELLITUS DUE TO UNDERLYING CONDITION (HCC): ICD-10-CM

## 2024-05-09 DIAGNOSIS — E10.649 UNCONTROLLED TYPE 1 DIABETES MELLITUS WITH HYPOGLYCEMIA WITHOUT COMA (HCC): ICD-10-CM

## 2024-05-09 DIAGNOSIS — E66.9 CLASS 1 OBESITY WITH BODY MASS INDEX (BMI) OF 32.0 TO 32.9 IN ADULT, UNSPECIFIED OBESITY TYPE, UNSPECIFIED WHETHER SERIOUS COMORBIDITY PRESENT: ICD-10-CM

## 2024-05-09 LAB
CARTRIDGE EXPIRATION DATE: ABNORMAL DATE
CARTRIDGE LOT#: ABNORMAL NUMERIC
GLUCOSE BLOOD: 317
HEMOGLOBIN A1C: 10.5 % (ref 4.3–5.6)
TEST STRIP EXPIRATION DATE: NORMAL DATE
TEST STRIP LOT #: NORMAL NUMERIC

## 2024-05-09 PROCEDURE — 83036 HEMOGLOBIN GLYCOSYLATED A1C: CPT | Performed by: INTERNAL MEDICINE

## 2024-05-09 PROCEDURE — 99205 OFFICE O/P NEW HI 60 MIN: CPT | Performed by: INTERNAL MEDICINE

## 2024-05-09 PROCEDURE — 82947 ASSAY GLUCOSE BLOOD QUANT: CPT | Performed by: INTERNAL MEDICINE

## 2024-05-09 PROCEDURE — 95251 CONT GLUC MNTR ANALYSIS I&R: CPT | Performed by: INTERNAL MEDICINE

## 2024-05-09 RX ORDER — INSULIN LISPRO 100 [IU]/ML
10 INJECTION, SOLUTION INTRAVENOUS; SUBCUTANEOUS 3 TIMES DAILY
Qty: 15 ML | Refills: 0 | Status: SHIPPED | OUTPATIENT
Start: 2024-05-09 | End: 2024-08-07

## 2024-05-09 RX ORDER — VALACYCLOVIR HYDROCHLORIDE 500 MG/1
TABLET, FILM COATED ORAL
COMMUNITY
Start: 2024-03-22

## 2024-05-09 RX ORDER — INSULIN PMP CART,AUT,G6/7,CNTR
1 EACH SUBCUTANEOUS
Qty: 270 EACH | Refills: 0 | Status: SHIPPED | OUTPATIENT
Start: 2024-05-09 | End: 2024-08-07

## 2024-05-09 RX ORDER — PROCHLORPERAZINE 25 MG/1
1 SUPPOSITORY RECTAL
Qty: 9 EACH | Refills: 1 | Status: SHIPPED | OUTPATIENT
Start: 2024-05-09 | End: 2024-11-05

## 2024-05-09 RX ORDER — INSULIN LISPRO 100 [IU]/ML
INJECTION, SOLUTION INTRAVENOUS; SUBCUTANEOUS
COMMUNITY

## 2024-05-09 RX ORDER — INSULIN GLARGINE 100 [IU]/ML
25 INJECTION, SOLUTION SUBCUTANEOUS NIGHTLY
Qty: 22.5 ML | Refills: 0 | Status: SHIPPED | OUTPATIENT
Start: 2024-05-09 | End: 2024-08-07

## 2024-05-09 RX ORDER — INSULIN PMP CART,AUT,G6/7,CNTR
1 EACH SUBCUTANEOUS ONCE
Qty: 1 KIT | Refills: 0 | Status: SHIPPED | OUTPATIENT
Start: 2024-05-09 | End: 2024-05-09

## 2024-05-09 RX ORDER — INSULIN PMP CART,AUT,G6/7,CNTR
EACH SUBCUTANEOUS
COMMUNITY
Start: 2024-03-03

## 2024-05-09 RX ORDER — INSULIN ASPART 100 [IU]/ML
70 INJECTION, SOLUTION INTRAVENOUS; SUBCUTANEOUS DAILY
COMMUNITY
Start: 2024-04-10

## 2024-05-09 RX ORDER — GLUCAGON 3 MG/1
3 POWDER NASAL ONCE AS NEEDED
Qty: 1 EACH | Refills: 0 | Status: SHIPPED | OUTPATIENT
Start: 2024-05-09 | End: 2024-05-09

## 2024-05-09 RX ORDER — PROCHLORPERAZINE 25 MG/1
SUPPOSITORY RECTAL
COMMUNITY
Start: 2024-02-09

## 2024-05-09 RX ORDER — PROCHLORPERAZINE 25 MG/1
1 SUPPOSITORY RECTAL
Qty: 1 EACH | Refills: 1 | Status: SHIPPED | OUTPATIENT
Start: 2024-05-09 | End: 2024-11-05

## 2024-05-09 RX ORDER — ONDANSETRON 4 MG/1
1 TABLET, ORALLY DISINTEGRATING ORAL EVERY 8 HOURS PRN
COMMUNITY
Start: 2024-04-30

## 2024-05-09 RX ORDER — BLOOD SUGAR DIAGNOSTIC
1 STRIP MISCELLANEOUS 4 TIMES DAILY
Qty: 100 EACH | Refills: 2 | Status: SHIPPED | OUTPATIENT
Start: 2024-05-09 | End: 2024-08-07

## 2024-05-09 RX ORDER — LANCETS 28 GAUGE
EACH MISCELLANEOUS
Qty: 100 EACH | Refills: 5 | Status: SHIPPED | OUTPATIENT
Start: 2024-05-09

## 2024-05-09 NOTE — PROGRESS NOTES
New Patient Evaluation - History and Physical    CONSULT - Reason for Visit:  uncontrolled t1DM  Requesting Physician:   BRI Chawla  CHIEF COMPLAINT:  No chief complaint on file.    HISTORY OF PRESENT ILLNESS:   Batsheva Marie is a 28 year old female who presents with uncontrolled complicated t1DM, DKA in the past, stillborn and planning pregnancy in the future.   She was late to her visit today   DM HISTORY  Diagnosed: t1DM since was 4 yr   DKA in the past ~ 12 x   Missed appt with her endo so was asked to look for another endocrine     Was getting heavy periods and had to get blood transfusion - last transfusion in 3/2024     CURRENT DIABETIC MEDICATIONS INCLUDE:  On lispro few times a day - cannot tell ICR nor ISF   Today took 32 units total so far but she needs 60 units a day usually    Not on long acting insulin   Was on omnipod till 2 weeks ago      HISTORY OF DIABETES COMPLICATIONS: :  History of Retinopathy:  not aware of     History of Neuropathy:  not aware of    History of Nephropathy: .not aware of      ASSOCIATED COMPLICATIONS:   HTN:  yes  Hyperlipidemia: no  CAD/ASCVD/PAD/CVA:  no    HOME GLUCOSE READINGS:    N/a         Lab Results   Component Value Date    A1C 10.5 (A) 05/09/2024    A1C 9.4 (H) 02/21/2024    A1C 8.6 (H) 01/04/2024    A1C 14.6 (H) 01/09/2020    A1C 12.5 (H) 04/15/2018        DM quality measures:  A1C/Blood pressure: as reported above   Last dilated eye exam: No data recorded due now   Exam shows retinopathy? No data recorded  Last diabetic foot exam: No data recorded  Dentist : recommend every 6m  Nephropathy screening:   ace /arb rx:   no  LIPID screening: Cholesterol Meds:      LDL Cholesterol: 107, done on 3/30/2017.          ASSESSMENT AND PLAN:  Batsheva Marie is a 28 year old female who presents with  uncontrolled complicated t1DM, DKA in the past, stillborn and planning pregnancy in the future.    I discussed with the pt in length today and she will benefit  from close f/u and more education.     Plan   Will send orders for long acting, short acting, CGM, omnipod new kit and pods, lancet, meter/strips, glucagon, ketone strips   Labs/MALB  follow up with diabetes educator in 2 weeks for CGM download  RTC in 3-4   Will refer to diabetes educator, podiatry, ophthalmology    Check blood sugar fasting, before meals and before bedtime.   If you have low blood sugar <70, take 15 grams of carb (8 oz juice or regular soda) and recheck in 15 minutes.    Follow up with podiatry and eye doctor annually.   Patients need to wear covered shoes all the time and check feet daily.   Bring your meter/BG log to the next visit.      Target A1c 6.5%  Target BG   BEFORE MEAL 100-130mg/dl  2hrs AFTER MEAL less than 180mg/dl      Basal INSULIN: (any of the following is fine) Lantus/Tresiba/Basaglar/Toujeo: 25  units once  a day      Check BG before meals.   If Blood sugar is high, take correction dose insulin ( in addition to meal dose if will eat) using short acting insulin (any of the following is fine)  Humalog/Novolog/Fiasp/Lyumjev    If Blood sugar is high, take correction dose insulin using short acting insulin (any of the following is fine) Humalog/Novolog/Fiasp/Lyumjev  181 - 175 mg/dL= 1 unit  176 - 200 mg/dL= 2 unit  201 - 225 mg/dL= 3 units  226 - 250 mg/dL= 4 unit  251 - 300 mg/dL= 6 units  301 - 350 mg/dL= 8 units  greater than 350 mg/dL = 10 unit       Meal-time INSULIN: (any of the following is fine)  Novolog/Humalog/Fiasp/Lyumjev  Take 8 units With large meals. +  correction dose if  BG is high  Take 4 units With smaller meals + correction dose if  BG is high        We discussed these in detail with the patient and negotiated which of numerous possible changes in the in the treatment plan that would be acceptable to them. The patient remains at ongoing is at high risk for complications related to uncontrolled diabetes and treatment.  The patient requires a great deal of  self-management and support. We expect the patient's risk to be reduced with the changes to the treatment plan that we recommended today.   We reviewed a very large amount of complicated data including BG target range, basal insulin doses, meal and correction related insulin boluses, time of meal, size of meal, time of BG testing and insulin administration in relations to meals. We also reviewed self-testing BG results if available.     PAST MEDICAL HISTORY:   Past Medical History:    Anxiety    Calculus of kidney    Depression    History of blood transfusion    no reactions    Hyperlipidemia    Insomnia    Pneumonia due to organism    Seizure disorder (HCC)    last episode 2020    Type 1 diabetes mellitus (HCC)    Visual impairment       PAST SURGICAL HISTORY:   Past Surgical History:   Procedure Laterality Date    Continue insulin pump     Kidney stone 2014  Polyp removed      CURRENT MEDICATIONS:     Continuous Glucose Transmitter (DEXCOM G6 TRANSMITTER) Does not apply Misc USE 1 TRANSMITTER AS DIRECTED. CHANGE EVERY 3 MONTHS.      insulin lispro 100 UNIT/ML Injection Solution INJECT 70 UNITS UNDER THE SKIN VIA INSULIN PUMP DAILY      ondansetron 4 MG Oral Tablet Dispersible Take 1 tablet (4 mg total) by mouth every 8 (eight) hours as needed.      valACYclovir 500 MG Oral Tab       Insulin Disposable Pump (OMNIPOD 5 G7 INTRO, GEN 5,) Does not apply Kit Place 1 each onto the skin one time for 1 dose. 1 kit 0    Insulin Disposable Pump (OMNIPOD 5 G7 PODS, GEN 5,) Does not apply Misc 1 each every third day. 270 each 0    Continuous Glucose Transmitter (DEXCOM G6 TRANSMITTER) Does not apply Misc 1 each every 3 (three) months. 1 each 1    Continuous Glucose Sensor (DEXCOM G6 SENSOR) Does not apply Misc 1 each Every 10 days. Use as directed every 10 days 9 each 1    insulin glargine (LANTUS SOLOSTAR) 100 UNIT/ML Subcutaneous Solution Pen-injector Inject 25 Units into the skin nightly. 22.5 mL 0    Insulin Lispro, 1  Unit Dial, (HUMALOG KWIKPEN) 100 UNIT/ML Subcutaneous Solution Pen-injector Inject 10 Units into the skin 3 (three) times daily. 15 mL 0    Insulin Pen Needle (PEN NEEDLES) 32G X 6 MM Does not apply Misc 1 each 4 (four) times daily. 100 each 2    Urine Glucose-Ketones Test In Vitro Strip Use if BG > 250 20 strip 0    glucagon (BAQSIMI TWO PACK) 3 MG/DOSE Nasal nasal powder 3 mg by Nasal route once as needed for Low blood glucose. 1 each 0    FreeStyle Lancets Does not apply Misc Use up to 3 times a day 100 each 5    medroxyPROGESTERone Acetate (PROVERA) 10 MG Oral Tab Take 2 tablets (20 mg total) by mouth daily. 20 tablet 0    Glucose Blood (ACCU-CHEK GUIDE) In Vitro Strip 1 strip by In Vitro route 3 (three) times daily before meals. 50 strip 3    ACCU-CHEK FASTCLIX LANCETS Does not apply Misc Test blood 3 times daily before meals and for signs, symptoms of hypoglycemia. 50 each 3    Blood Glucose Monitoring Suppl (ACCU-CHEK GUIDE) w/Device Does not apply Kit 1 kit by Does not apply route 3 (three) times daily before meals. 1 kit 0      Glucagon (rDNA) 1 MG injection 1 mg  1 mg Subcutaneous Once            ALLERGIES:  Allergies   Allergen Reactions    Shellfish-Derived Products ANAPHYLAXIS and HIVES       SOCIAL HISTORY:    Social History     Socioeconomic History    Marital status: Single   Tobacco Use    Smoking status: Never    Smokeless tobacco: Never   Vaping Use    Vaping status: Never Used   Substance and Sexual Activity    Alcohol use: Yes     Alcohol/week: 0.0 standard drinks of alcohol     Comment: wine - every other weekend    Drug use: Not Currently     Types: Cannabis    Sexual activity: Yes     Birth control/protection: Injection     In restaurent  Smoking no  Marijuana no  Etoh occ  Drugs no    FAMILY HISTORY:   Family History   Problem Relation Age of Onset    Other (Other) Mother     Cancer Maternal Grandmother     Diabetes Paternal Grandfather     Diabetes Sister     Diabetes Brother     Diabetes  Brother      t2 DM on father's side : F, PGF,  aunts and uncles  2 sisters with t 1DM   Brother with t1DM     REVIEW OF SYSTEMS:  All negative other than HPI    PHYSICAL EXAM:   Height: 5' 3\" (160 cm) (05/09 1506)  Weight: 181 lb (82.1 kg) (05/09 1506)  BSA (Calculated - sq m): 1.85 sq meters (05/09 1506)  Pulse: 95 (05/09 1506)  BP: 130/72 (05/09 1506)  Temp: --  Do Not Use - Resp Rate: --  SpO2: 98 % (05/09 1506)    Body mass index is 32.06 kg/m².  No lipohypertrophy   CONSTITUTIONAL:  Awake and alert. Age appropriate, good hygiene not in acute distress. Well-nourished and well developed. no acute distress   PSYCH:   Orientated to time, place, person & situation, Normal mood and affect, memory intact, normal insight and judgment, cooperative  Neuro: speech is clear. Awake, alert, no aphasia, no facial asymmetry, no nuchal rigidity  EYES:  No proptosis, no ptosis, conjunctiva normal  ENT:  Normocephalic, atraumatic  Eye: EOMI, normal lids, no discharge, no conjunctival erythema. No exophthalmos/proptosis, Ptosis negative   No rhinorrhea, moist oral mucosa  Neck: full range of motion  Neck/Thyroid: neck inspection: normal, No scar, No goiter   LUNGS:  No acute respiratory distress, non-labored respiration. Speaking full sentences  CARDIOVASCULAR:  regular rate   ABDOMEN:  No abdominal pain.   SKIN:  no bruising or bleeding, no rashes and no lesions, Skin is dry, no obvious rashes or lesions  EXTREMITIES: no gross abnormality   MSK: Moves extremities spontaneously. full range of motion in all major joints      DATA:     Pertinent data reviewed   CT ABD 2020 ADRENALS:   No defined mass or abnormal enlargemen       Latest Reference Range & Units 02/21/24 11:01 05/09/24 16:03   HEMOGLOBIN A1C 4.3 - 5.6 %  10.5 !   HEMOGLOBIN A1c <5.7 % 9.4 (H)    ESTIMATED AVERAGE GLUCOSE 68 - 126 mg/dL 223 (H)    !: Data is abnormal  (H): Data is abnormally high  No results for input(s): \"TSH\", \"T4F\", \"T3F\", \"THYP\" in the last 72  hours.  No results found.  ASSAY QUANTITATIVE, GLUCOSE        Component Value Flag Ref Range Units Status    GLUCOSE BLOOD 317        Final    Test Strip Lot # [id number]       Numeric Final    Test Strip Expiration Date 101,924       Date Final                  HEMOGLOBIN A1C        Component Value Flag Ref Range Units Status    HEMOGLOBIN A1C 10.5      4.3 - 5.6 % Final    Cartridge Lot# [id number]       Numeric Final    Cartridge Expiration Date 113,025       Date Final                  Orders Placed This Encounter   Procedures    ASSAY QUANTITATIVE, GLUCOSE    HEMOGLOBIN A1C    Microalb/Creat Ratio, Random Urine     Orders Placed This Encounter    ASSAY QUANTITATIVE, GLUCOSE     Order Specific Question:   Release to patient     Answer:   Immediate    HEMOGLOBIN A1C     Order Specific Question:   Release to patient     Answer:   Immediate    Microalb/Creat Ratio, Random Urine     Standing Status:   Future     Standing Expiration Date:   2025     Order Specific Question:   Release to patient     Answer:   Immediate    Continuous Glucose Transmitter (DEXCOM G6 TRANSMITTER) Does not apply Misc     Sig: USE 1 TRANSMITTER AS DIRECTED. CHANGE EVERY 3 MONTHS.    insulin aspart 100 Units/mL Injection Solution     Si Units by Insulin pump route daily.    Insulin Disposable Pump (OMNIPOD 5 G6 PODS, GEN 5,) Does not apply Misc     Sig: PLACE 1 UNDER THE SKIN EVERY 3 DAYS    insulin lispro 100 UNIT/ML Injection Solution     Sig: INJECT 70 UNITS UNDER THE SKIN VIA INSULIN PUMP DAILY    ondansetron 4 MG Oral Tablet Dispersible     Sig: Take 1 tablet (4 mg total) by mouth every 8 (eight) hours as needed.    valACYclovir 500 MG Oral Tab    Insulin Disposable Pump (OMNIPOD 5 G7 INTRO, GEN 5,) Does not apply Kit     Sig: Place 1 each onto the skin one time for 1 dose.     Dispense:  1 kit     Refill:  0    Insulin Disposable Pump (OMNIPOD 5 G7 PODS, GEN 5,) Does not apply Misc     Si each every third day.     Dispense:   270 each     Refill:  0    Continuous Glucose Transmitter (DEXCOM G6 TRANSMITTER) Does not apply Misc     Si each every 3 (three) months.     Dispense:  1 each     Refill:  1    Continuous Glucose Sensor (DEXCOM G6 SENSOR) Does not apply Misc     Si each Every 10 days. Use as directed every 10 days     Dispense:  9 each     Refill:  1    insulin glargine (LANTUS SOLOSTAR) 100 UNIT/ML Subcutaneous Solution Pen-injector     Sig: Inject 25 Units into the skin nightly.     Dispense:  22.5 mL     Refill:  0    Insulin Lispro, 1 Unit Dial, (HUMALOG KWIKPEN) 100 UNIT/ML Subcutaneous Solution Pen-injector     Sig: Inject 10 Units into the skin 3 (three) times daily.     Dispense:  15 mL     Refill:  0    Insulin Pen Needle (PEN NEEDLES) 32G X 6 MM Does not apply Misc     Si each 4 (four) times daily.     Dispense:  100 each     Refill:  2    Glucagon (rDNA) 1 MG injection 1 mg    Urine Glucose-Ketones Test In Vitro Strip     Sig: Use if BG > 250     Dispense:  20 strip     Refill:  0    glucagon (BAQSIMI TWO PACK) 3 MG/DOSE Nasal nasal powder     Sig: 3 mg by Nasal route once as needed for Low blood glucose.     Dispense:  1 each     Refill:  0    FreeStyle Lancets Does not apply Misc     Sig: Use up to 3 times a day     Dispense:  100 each     Refill:  5          This is a specialized patient consultation in endocrinology and required comprehensive review of prior records, as well as current evaluation, with time required for consideration of complex endocrine issues and consultation. For this visit, I personally interviewed the patient, and family member if accompanied, performed the pertinent parts of the history and physical examination. ROS included screening for appropriate endocrine conditions.   Today's diagnosis and plan were reviewed in detail with the patient who states understanding and agrees with plan. I discussed with the patient possible diagnosis, differential diagnosis, need for work up,  treatment options, alternatives and side effects.     Please see note for details about time spent which includes:   · pre-visit preparation  · reviewing records  · face to face time with the patient   · timely documentation of the encounter  · ordering medications/tests  · communication with care team  · care coordination    I appreciate the opportunity to be part of your patient's medical care and will keep you, as the referring and primary physicians, informed about the care of your patient. Please feel free to contact me should you have any questions.      The 21st Century Cures Act makes medical notes like these available to patients in the interest of transparency. Please be advised this is a medical document. Medical documents are intended to carry relevant information, facts as evident, and the clinical opinion of the practitioner. The medical note is intended as peer to peer communication and may appear blunt or direct. It is written in medical language and may contain abbreviations or verbiage that are unfamiliar.     Booker Peters MD

## 2024-05-09 NOTE — PATIENT INSTRUCTIONS
Needs more education and close follow up   Will send orders for long acting, short acting, CGM, omnipod new kit and pods, lancet, meter/strips, glucagon, ketone strips   Labs filiberto SAENZ soon   follow up with diabetes educator in 2 weeks for CGM download  RTC in 3-4   Will refer to diabetes educator, podiatry, ophthalmology    Check blood sugar fasting, before meals and before bedtime.   If you have low blood sugar <70, take 15 grams of carb (8 oz juice or regular soda) and recheck in 15 minutes.    Follow up with podiatry and eye doctor annually.   Patients need to wear covered shoes all the time and check feet daily.   Bring your meter/BG log to the next visit.      Target A1c 6.5%  Target BG   BEFORE MEAL 100-130mg/dl  2hrs AFTER MEAL less than 180mg/dl      Basal INSULIN: (any of the following is fine) Lantus/Tresiba/Basaglar/Toujeo: 25  units once  a day      Check BG before meals.   If Blood sugar is high, take correction dose insulin ( in addition to meal dose if will eat) using short acting insulin (any of the following is fine)  Humalog/Novolog/Fiasp/Lyumjev    If Blood sugar is high, take correction dose insulin using short acting insulin (any of the following is fine) Humalog/Novolog/Fiasp/Lyumjev  181 - 175 mg/dL= 1 unit  176 - 200 mg/dL= 2 unit  201 - 225 mg/dL= 3 units  226 - 250 mg/dL= 4 unit  251 - 300 mg/dL= 6 units  301 - 350 mg/dL= 8 units  greater than 350 mg/dL = 10 unit         Meal-time INSULIN: (any of the following is fine)  Novolog/Humalog/Fiasp/Lyumjev  Take 8 units With large meals. +  correction dose if  BG is high  Take 4 units With smaller meals + correction dose if  BG is high

## 2024-12-09 NOTE — TELEPHONE ENCOUNTER
Patient requesting prior authorization for Omnipod 5 pump supplies, Dexcom G6 sensors and transmitter.  Please call.  Thank you.

## 2024-12-10 NOTE — TELEPHONE ENCOUNTER
Endocrine refill protocol for basal insulins     Protocol Criteria: FAILED Reason: Elevated A1C    If all below requirements are met, send a 90-day supply with 1 refill per provider protocol.       Verify Appointment with Endocrinology completed in the last 6 months or scheduled in the next 3 months.  Verify A1C has been completed within the last 6 months and is below 8.5%     Last completed office visit:5/9/2024 Booker Peters MD   Next scheduled Follow up: No future appointments.   Last A1c result: Last A1c value was 10.5% done 5/9/2024.

## 2024-12-12 NOTE — TELEPHONE ENCOUNTER
PA submitted for Omnipod introkit, omnipod pods, dexcom G6 sensor, dexcom G6 transmitter via [employer].    Called patient, states her [facility] filled the prescriptions from 5/9 and she will attempt to pick them up today, but she was not sure if PA was needed.    States she used to be on the omnipod but could not continue using pump due to losing insurance coverage.     Now patient has Medicaid again. Notified patient that PAs were submitted and should have determination by next week if she is unable to  prescriptions today.

## 2025-01-05 NOTE — ED INITIAL ASSESSMENT (HPI)
Pt arrives ambulatory to ED for fluctuating blood sugar levels since 1230am today. Pt states originally BG dropped to 38, then spiked to 120s. Pt BG in triage 430s, upon recheck 130s. See chart for exact details. +shakiness, nausea. Aox4, speaking in full sentences.

## 2025-02-06 NOTE — PROGRESS NOTES
Reason for Visit:  uncontrolled t1DM  Requesting Physician:   .BRI Delgadillo  CHIEF COMPLAINT:    Chief Complaint   Patient presents with    Diabetes     F/u     HISTORY OF PRESENT ILLNESS:   Batsheva Marie is a 29 year old female who presents with uncontrolled complicated t1DM, DKA in the past, stillborn and planning pregnancy in the future.   Did not have insurance so stopped using insulin pump   Was on MDI   She resumed pump in Nov /2024   LMP 1/2025 , regular   We gave her rx for long acting but did not get   Has ketones strips  DM HISTORY  Diagnosed: t1DM since was 4 yr   DKA in the past ~ 12 x   Missed appt with her endo so was asked to look for another endocrine     Was getting heavy periods and had to get blood transfusion - last transfusion in 3/2024     CURRENT DIABETIC MEDICATIONS INCLUDE:  Using pump     Not on long acting insulin      HISTORY OF DIABETES COMPLICATIONS: :  History of Retinopathy:  not aware of     History of Neuropathy:  not aware of    History of Nephropathy: .not aware of      ASSOCIATED COMPLICATIONS:   HTN:  yes  Hyperlipidemia: no  CAD/ASCVD/PAD/CVA:  no    HOME GLUCOSE READINGS:    N/a         Lab Results   Component Value Date    A1C 10.5 (A) 05/09/2024    A1C 9.4 (H) 02/21/2024    A1C 8.6 (H) 01/04/2024    A1C 14.6 (H) 01/09/2020    A1C 12.5 (H) 04/15/2018        DM quality measures:  A1C/Blood pressure: as reported above   Last dilated eye exam: No data recorded due now   Exam shows retinopathy? No data recorded  Last diabetic foot exam: No data recorded  Dentist : recommend every 6m  Nephropathy screening:   ace /arb rx:   no  LIPID screening: Cholesterol Meds:      LDL Cholesterol: 107, done on 3/30/2017.     1/29/2025 A1c 10.3,      ASSESSMENT AND PLAN:  Batsheva Marie is a 29 year old female who presents with  uncontrolled complicated t1DM, DKA in the past, stillborn and planning pregnancy in the future.    I discussed with the pt in length today and she will  benefit from close f/u and more education.   A1c is too high for pregnancy now   She understands risk/benefit   Plan    Will connect insulin pump to the office   She had high BG fasting and postprandial   new setting   Basal 4 -> 2 unit/hr   ICR 15 -> 8  ISF 50->35   AIT 2-> 4  Target 110  Labs today   follow up with diabetes educator    RTC in 3-4 weeks       Check blood sugar fasting, before meals and before bedtime.   If you have low blood sugar <70, take 15 grams of carb (8 oz juice or regular soda) and recheck in 15 minutes.    Follow up with podiatry and eye doctor annually.   Patients need to wear covered shoes all the time and check feet daily.   Bring your meter/BG log to the next visit.      Target A1c 6.5%  Target BG   BEFORE MEAL 100-130mg/dl  2hrs AFTER MEAL less than 180mg/dl      Basal INSULIN: (any of the following is fine) Lantus/Tresiba/Basaglar/Toujeo: 25  units once  a day      Check BG before meals.   If Blood sugar is high, take correction dose insulin ( in addition to meal dose if will eat) using short acting insulin (any of the following is fine)  Humalog/Novolog/Fiasp/Lyumjev    If Blood sugar is high, take correction dose insulin using short acting insulin (any of the following is fine) Humalog/Novolog/Fiasp/Lyumjev  181 - 175 mg/dL= 1 unit  176 - 200 mg/dL= 2 unit  201 - 225 mg/dL= 3 units  226 - 250 mg/dL= 4 unit  251 - 300 mg/dL= 6 units  301 - 350 mg/dL= 8 units  greater than 350 mg/dL = 10 unit       Meal-time INSULIN: (any of the following is fine)  Novolog/Humalog/Fiasp/Lyumjev  Take 8 units With large meals. +  correction dose if  BG is high  Take 4 units With smaller meals + correction dose if  BG is high        We discussed these in detail with the patient and negotiated which of numerous possible changes in the in the treatment plan that would be acceptable to them. The patient remains at ongoing is at high risk for complications related to uncontrolled diabetes and treatment.   The patient requires a great deal of self-management and support. We expect the patient's risk to be reduced with the changes to the treatment plan that we recommended today.   We reviewed a very large amount of complicated data including BG target range, basal insulin doses, meal and correction related insulin boluses, time of meal, size of meal, time of BG testing and insulin administration in relations to meals. We also reviewed self-testing BG results if available.     PAST MEDICAL HISTORY:   Past Medical History:    Anxiety    Calculus of kidney    Depression    History of blood transfusion    no reactions    Hyperlipidemia    Insomnia    Pneumonia due to organism    Seizure disorder (HCC)    last episode 2020    Type 1 diabetes mellitus (HCC)    Visual impairment       PAST SURGICAL HISTORY:   Past Surgical History:   Procedure Laterality Date    Continue insulin pump     Kidney stone 2014  Polyp removed      CURRENT MEDICATIONS:     insulin glargine (BASAGLAR KWIKPEN) 100 UNIT/ML Subcutaneous Solution Pen-injector Inject 25 Units into the skin nightly. 24 mL 0    Insulin Disposable Pump (OMNIPOD 5 [id number] PODS GEN 5) Does not apply Misc Inject 1 each into the skin every third day. 10 each 11    Continuous Glucose Sensor (DEXCOM G6 SENSOR) Does not apply Misc 1 each Every 10 days. Use as directed every 10 days 9 each 1    Continuous Glucose Transmitter (DEXCOM G6 TRANSMITTER) Does not apply Misc 1 each every 3 (three) months. 1 each 1    insulin lispro 100 UNIT/ML Injection Solution INJECT 70 UNITS UNDER THE SKIN VIA INSULIN PUMP DAILY 66 mL 5      Glucagon (rDNA) 1 MG injection 1 mg  1 mg Subcutaneous Once            ALLERGIES:  Allergies   Allergen Reactions    Shellfish-Derived Products ANAPHYLAXIS and HIVES       SOCIAL HISTORY:    Social History     Socioeconomic History    Marital status: Single   Tobacco Use    Smoking status: Never    Smokeless tobacco: Never   Vaping Use    Vaping status: Never Used    Substance and Sexual Activity    Alcohol use: Yes     Alcohol/week: 0.0 standard drinks of alcohol     Comment: wine - every other weekend    Drug use: Not Currently     Types: Cannabis    Sexual activity: Yes     Birth control/protection: Injection     In restaurent  Smoking no  Marijuana no  Etoh occ  Drugs no    FAMILY HISTORY:   Family History   Problem Relation Age of Onset    Other (Other) Mother     Cancer Maternal Grandmother     Diabetes Paternal Grandfather     Diabetes Sister     Diabetes Brother     Diabetes Brother      t2 DM on father's side : F, PGF,  aunts and uncles  2 sisters with t 1DM   Brother with t1DM        PHYSICAL EXAM:   Height: 5' 3\" (160 cm) (02/06 1248)  Weight: 190 lb (86.2 kg) (02/06 1248)  BSA (Calculated - sq m): 1.89 sq meters (02/06 1248)  Pulse: 108 (02/06 1248)  BP: 114/70 (02/06 1248)  Temp: --  Do Not Use - Resp Rate: --  SpO2: --    Body mass index is 33.66 kg/m².  No lipohypertrophy        DATA:     Pertinent data reviewed   CT ABD 2020 ADRENALS:   No defined mass or abnormal enlargemen       Latest Reference Range & Units 02/21/24 11:01 05/09/24 16:03   HEMOGLOBIN A1C 4.3 - 5.6 %  10.5 !   HEMOGLOBIN A1c <5.7 % 9.4 (H)    ESTIMATED AVERAGE GLUCOSE 68 - 126 mg/dL 223 (H)    !: Data is abnormal  (H): Data is abnormally high  No results for input(s): \"TSH\", \"T4F\", \"T3F\", \"THYP\" in the last 72 hours.  No results found.      Orders Placed This Encounter   Procedures    TSH W Reflex To Free T4    Microalb/Creat Ratio, Random Urine    Lipid Panel    Basic Metabolic Panel (8)     Orders Placed This Encounter    TSH W Reflex To Free T4     Standing Status:   Future     Number of Occurrences:   1     Standing Expiration Date:   2/6/2026     Order Specific Question:   Release to patient     Answer:   Immediate    Microalb/Creat Ratio, Random Urine     Standing Status:   Future     Number of Occurrences:   1     Standing Expiration Date:   2/6/2026     Order Specific Question:    Release to patient     Answer:   Immediate    Lipid Panel     Standing Status:   Future     Number of Occurrences:   1     Standing Expiration Date:   2026     Order Specific Question:   Release to patient     Answer:   Immediate    Basic Metabolic Panel (8)     Standing Status:   Future     Number of Occurrences:   1     Standing Expiration Date:   2026     Order Specific Question:   Release to patient     Answer:   Immediate    insulin glargine (BASAGLAR KWIKPEN) 100 UNIT/ML Subcutaneous Solution Pen-injector     Sig: Inject 25 Units into the skin nightly.     Dispense:  24 mL     Refill:  0    Insulin Disposable Pump (OMNIPOD 5 [id number] PODS GEN 5) Does not apply Misc     Sig: Inject 1 each into the skin every third day.     Dispense:  10 each     Refill:  11    Continuous Glucose Sensor (DEXCOM G6 SENSOR) Does not apply Misc     Si each Every 10 days. Use as directed every 10 days     Dispense:  9 each     Refill:  1    Continuous Glucose Transmitter (DEXCOM G6 TRANSMITTER) Does not apply Misc     Si each every 3 (three) months.     Dispense:  1 each     Refill:  1    insulin lispro 100 UNIT/ML Injection Solution     Sig: INJECT 70 UNITS UNDER THE SKIN VIA INSULIN PUMP DAILY     Dispense:  66 mL     Refill:  5          This is a specialized patient consultation in endocrinology and required comprehensive review of prior records, as well as current evaluation, with time required for consideration of complex endocrine issues and consultation. For this visit, I personally interviewed the patient, and family member if accompanied, performed the pertinent parts of the history and physical examination. ROS included screening for appropriate endocrine conditions.   Today's diagnosis and plan were reviewed in detail with the patient who states understanding and agrees with plan. I discussed with the patient possible diagnosis, differential diagnosis, need for work up, treatment options, alternatives and  side effects.     Please see note for details about time spent which includes:   · pre-visit preparation  · reviewing records  · face to face time with the patient   · timely documentation of the encounter  · ordering medications/tests  · communication with care team  · care coordination    I appreciate the opportunity to be part of your patient's medical care and will keep you, as the referring and primary physicians, informed about the care of your patient. Please feel free to contact me should you have any questions.      The 21st Century Cures Act makes medical notes like these available to patients in the interest of transparency. Please be advised this is a medical document. Medical documents are intended to carry relevant information, facts as evident, and the clinical opinion of the practitioner. The medical note is intended as peer to peer communication and may appear blunt or direct. It is written in medical language and may contain abbreviations or verbiage that are unfamiliar.     Booker Peters MD

## 2025-02-06 NOTE — PATIENT INSTRUCTIONS
Labs today   follow up with diabetes educator    RTC in 3-4 weeks     Check blood sugar fasting, before meals and before bedtime.   If you have low blood sugar <70, take 15 grams of carb (8 oz juice or regular soda) and recheck in 15 minutes.    Follow up with podiatry and eye doctor annually.   Patients need to wear covered shoes all the time and check feet daily.   Bring your meter/BG log to the next visit.      Target A1c 6.5%  Target BG   BEFORE MEAL 100-130mg/dl  2hrs AFTER MEAL less than 180mg/dl      Basal INSULIN: (any of the following is fine) Lantus/Tresiba/Basaglar/Toujeo: 25  units once  a day      Check BG before meals.   If Blood sugar is high, take correction dose insulin ( in addition to meal dose if will eat) using short acting insulin (any of the following is fine)  Humalog/Novolog/Fiasp/Lyumjev    If Blood sugar is high, take correction dose insulin using short acting insulin (any of the following is fine) Humalog/Novolog/Fiasp/Lyumjev  181 - 175 mg/dL= 1 unit  176 - 200 mg/dL= 2 unit  201 - 225 mg/dL= 3 units  226 - 250 mg/dL= 4 unit  251 - 300 mg/dL= 6 units  301 - 350 mg/dL= 8 units  greater than 350 mg/dL = 10 unit       Meal-time INSULIN: (any of the following is fine)  Novolog/Humalog/Fiasp/Lyumjev  Take 8 units With large meals. +  correction dose if  BG is high  Take 4 units With smaller meals + correction dose if  BG is high      pregnant DM     BG target:  Fasting glucose concentration <95 mg/dL (  Preprandial glucose concentration <100 mg/dL  One-hour postprandial glucose concentration <140 mg/dL   Two-hour postprandial glucose concentration <120 mg/dL   Mean capillary glucose concentration 100 mg/dL   Nocturnal glucose concentration >=60 mg/dL   A1c target 6-6.5%

## 2025-02-06 NOTE — PROGRESS NOTES
-----------------------------  Dexcom Clarity  -----------------------------  Batsheva Marie    YOB: 1995    Generated at: Thu, Feb 6, 2025 12:56 PM CST    Reporting period: Wed Jan 8, 2025 - Thu Feb 6, 2025  -----------------------------  Glucose Details    Average glucose: 228 mg/dL    GMI: 8.8%    Standard deviation: 93 mg/dL    Coefficient of Variation: 40.6%  -----------------------------  Time in Range    Very High: 40%    High: 27%    In Range: 31%    Low: 2%    Very Low: <1%    Target Range   mg/dL    -----------------------------  Sensor usage    Days with data: 22/30    Time active: 87%    Avg. calibrations per day: 0.0

## 2025-02-10 NOTE — TELEPHONE ENCOUNTER
Patient needs new prescriptions sent for dexcom ,omnipod, and basaglar kwikpen and glucagon 1 mg injection  please follow up

## 2025-02-11 NOTE — TELEPHONE ENCOUNTER
Endocrine refill protocol for basal insulins     Protocol Criteria: FAILED Reason: Elevated A1C    If all below requirements are met, send a 90-day supply with 1 refill per provider protocol.       Verify Appointment with Endocrinology completed in the last 6 months or scheduled in the next 3 months.  Verify A1C has been completed within the last 6 months and is below 8.5%     Last completed office visit:2/6/2025 Booker Peters MD   Next scheduled Follow up:   Future Appointments   Date Time Provider Department Center   3/3/2025  2:15 PM [facility]      Last A1c result: Last A1c value was 10.5% done 5/9/2024.

## 2025-02-12 NOTE — TELEPHONE ENCOUNTER
Endocrine Refill protocol for CGM supplies     Protocol Criteria:  PASSED Reason: N/A    If below requirement is met, send a 90-day supply with 1 refill per provider protocol.     Verify appointment with Endocrinology completed in the last 12 months or scheduled in the next 6 months     Last completed office visit:2/6/2025 Booker Peters MD   Next scheduled Follow up:   Future Appointments   Date Time Provider Department Center   2/19/2025  3:15 PM Shanell Gallardo RD [facility] DIABET[facility]   3/3/2025  2:15 PM [facility]

## 2025-02-17 NOTE — TELEPHONE ENCOUNTER
Patient calling states was told by pharmacy that prior authorization was no requested. Patient states is out of supplies. Please call.

## 2025-02-17 NOTE — TELEPHONE ENCOUNTER
Called pharmacy and was notified that Dexcom G6, omnipod pods, and transmitter requires PA.   PA for all submitted via [employer]. Patient notified.     Per tech, basaglar is not covered. Preferred alternative is Lantus. Script sent per protocol.   Per LOV 2/6:   Basal INSULIN: (any of the following is fine) Lantus/Tresiba/Basaglar/Toujeo: 25  units once  a day

## 2025-02-18 NOTE — TELEPHONE ENCOUNTER
Dereck Peters,    Patient is scheduled to see us tomorrow, however we need a new order. Patient was scheduled with the wrong educator due to the incorrect order that was placed so we will have to reschedule her. Please sign the attached order.    Thank you,  Obdulia

## 2025-02-20 NOTE — MR AVS SNAPSHOT
Cystoscopy Operative Note  2/20/2025    Preoperative Diagnosis:   Bladder calculus    Postoperative Diagnosis:  Bladder calculus    Procedure:  Litholapaxy: crushing or fragmentation of calculus by any means in bladder and removal of fragments; simple or small (less than 2.5 cm)  (00354)    Attending Surgeon: Derrick Miller MD    Anesthesia: Monitored Anesthesia Care    EBL: <5 mL    Complications: None    Findings:  1 cm bladder calculus and smaller fragments    Drains: None    Reason for procedure: Sae Allen Jr. is a very pleasant 80 y.o. male who presented with a history of  bladder calculus and recurrent UTI  and was scheduled for cystoscopy for evaluation and management.    Procedure in Detail:  Informed consent was obtained by explaining all risks and benefits of the procedure to the patient in detail.  After informed consent, the patient was brought to the suite where Monitored Anesthesia Carewas administered prior to initiation of the invasive procedure. Appropriate perioperative antibiotics were given within 30 minutes of beginning the procedure. A formal timeout was performed prior to the procedure. The patient was gently placed in lithotomy position with all pressure points padded. Bilateral sequential compression devices were applied and activated. The patient was prepped and draped in standard fashion.    A 20-[place] rigid cystoscope was passed per urethra into the bladder. Inspection of the bladder demonstrated  a 1 cm bladder calculus in the dependent portion of the bladder. The forceps were used to break the stone into a smaller fragment, both of which were removed through the scope. No additional stone fragments or foreign bodies were noted.     He tolerated the procedure well and was transferred in stable condition to the recovery room.     We will plan to see him back in 3 months for continued evaluation.      [facility],[address], [address] [place] [address] Rd  [phone]               Thank you for choosing us for your health care visit with Odette Frankel, NP.   We are glad to serve you and happy to provide you with this Services - 6 hours (Topics: Nutrition, programming pump, pattern management). Individual if class is not available.       Note:  Medicare covers 10 hours initial DSMT (Diabetes Self-Management Training) in a 12-month period for the first year of diagnosis, any authorization numbers or be assured that none are required. You can then schedule your appointment. Failure to obtain required authorization numbers can create reimbursement issues, leaving you responsible for the balance.     It is your responsibility Diabetes           Medical Issues Discussed Today     Uncontrolled type 1 diabetes mellitus      Instructions and Information about Your Health     None      Allergies as of Jun 20, 2017     Shellfish-Derived Products Anaphylaxis                Today's Vi Now link in the [employer]. Enter your [employer] Activation Code exactly as it appears below along with your Zip Code and Date of Birth to complete the sign-up process. If you do not sign up before the expiration date, you must request a new code.     Catherine Bui priority on exercise in your life                    Visit [facility] online at  [email or web address]

## 2025-03-12 NOTE — TELEPHONE ENCOUNTER
Call patient to reschedule appointment for Intensive Insulin Therapy - Patient on OP5  on 4/2/25 @1:15 with Educator Teresa Pressley

## 2025-03-24 NOTE — PATIENT INSTRUCTIONS
3/24/2025 a1c: 9.3  Keep auto mode all the time     Will schedule a follow upwith diabetes educator   Avoid pregnancy till we control BG   Will target zero low BG first, then work on fixing hyperglycemia   Bolus for every carb you eat   new setting   Basal  2-> 1.5  unit/hr   ICR   8  ISF  35   AIT  4  Target 110   follow up with diabetes educator    RTC in 3-4 weeks   pregnant DM     BG target:  Fasting glucose concentration <95 mg/dL (  Preprandial glucose concentration <100 mg/dL  One-hour postprandial glucose concentration <140 mg/dL   Two-hour postprandial glucose concentration <120 mg/dL   Mean capillary glucose concentration 100 mg/dL   Nocturnal glucose concentration >=60 mg/dL   A1c target 6-6.5%

## 2025-03-24 NOTE — PROGRESS NOTES
Reason for Visit:  uncontrolled t1DM  Requesting Physician:   .BRI Delgadillo  CHIEF COMPLAINT:    Chief Complaint   Patient presents with    Diabetes     F/u     HISTORY OF PRESENT ILLNESS:   Batsheva Marie is a 29 year old female who presents with uncontrolled complicated t1DM, DKA in the past, stillborn and planning pregnancy in the future.   She gets low BG x3 /wk  She gets high after correcting low.    Works at [employer] and has to start at 5 AM sometimes. Active at work . Gets low BG sometimes   Not on automode  We discussed pump and CGM data     She resumed pump in Nov /2024   LMP 2/2025 , regular   She has long acting insulin    Has ketones strips  DM HISTORY  Diagnosed: t1DM since was 4 yr   DKA in the past ~ 12 x   Missed appt with her endo so was asked to look for another endocrine     Was getting heavy periods and had to get blood transfusion - last transfusion in 3/2024     CURRENT DIABETIC MEDICATIONS INCLUDE:  Using pump     Not on long acting insulin      HISTORY OF DIABETES COMPLICATIONS: :  History of Retinopathy: 2024    History of Neuropathy: no    History of Nephropathy: .no     ASSOCIATED COMPLICATIONS:   HTN:  yes  Hyperlipidemia: no  CAD/ASCVD/PAD/CVA:  no    HOME GLUCOSE READINGS:             Lab Results   Component Value Date    A1C 9.3 (A) 03/24/2025    A1C 10.5 (A) 05/09/2024    A1C 9.4 (H) 02/21/2024    A1C 8.6 (H) 01/04/2024    A1C 14.6 (H) 01/09/2020        DM quality measures:  A1C/Blood pressure: as reported above   Last dilated eye exam: No data recorded due now   Exam shows retinopathy? No data recorded  Last diabetic foot exam: No data recorded  Dentist : recommend every 6m  Nephropathy screening:   ace /arb rx:   no  LIPID screening: Cholesterol Meds:      Cholesterol: 210, done on 2/6/2025.  HDL Cholesterol: 37, done on 2/6/2025.  TriGlycerides 265, done on 2/6/2025.  LDL Cholesterol: 126, done on 2/6/2025.           ASSESSMENT AND PLAN:  Batsheva Marie is a 29 year  old female who presents with  uncontrolled complicated t1DM, DKA in the past, stillborn and planning pregnancy in the future.    She will benefit from close f/u and more education.   A1c is too high for pregnancy now   She understands risk/benefit   GMI 8.5      Plan   Last A1c value was 9.3% done 3/24/2025.    Keep auto mode all the time   Will schedule a follow up with diabetes educator   Avoid pregnancy till we control BG   Will target zero low BG first, then work on fixing hyperglycemia   Bolus for every carb you eat   new setting   Basal  2-> 1.5  unit/hr   ICR   8  ISF  35   AIT  4  Target 110   follow up with diabetes educator    RTC in 3-4 weeks       Check blood sugar fasting, before meals and before bedtime.   If you have low blood sugar <70, take 15 grams of carb (8 oz juice or regular soda) and recheck in 15 minutes.    Follow up with podiatry and eye doctor annually.   Patients need to wear covered shoes all the time and check feet daily.   Bring your meter/BG log to the next visit.      Target A1c 6.5%  Target BG   BEFORE MEAL 100-130mg/dl  2hrs AFTER MEAL less than 180mg/dl          We discussed these in detail with the patient and negotiated which of numerous possible changes in the in the treatment plan that would be acceptable to them. The patient remains at ongoing is at high risk for complications related to uncontrolled diabetes and treatment.  The patient requires a great deal of self-management and support. We expect the patient's risk to be reduced with the changes to the treatment plan that we recommended today.   We reviewed a very large amount of complicated data including BG target range, basal insulin doses, meal and correction related insulin boluses, time of meal, size of meal, time of BG testing and insulin administration in relations to meals. We also reviewed self-testing BG results if available.     PAST MEDICAL HISTORY:   Past Medical History:    Anxiety    Calculus of kidney     Depression    History of blood transfusion    no reactions    Hyperlipidemia    Insomnia    Pneumonia due to organism    Seizure disorder (HCC)    last episode 2020    Type 1 diabetes mellitus (HCC)    Visual impairment       PAST SURGICAL HISTORY:   Past Surgical History:   Procedure Laterality Date    Continue insulin pump     Kidney stone 2014  Polyp removed      CURRENT MEDICATIONS:     insulin glargine (LANTUS SOLOSTAR) 100 UNIT/ML Subcutaneous Solution Pen-injector Inject 25 Units into the skin nightly. 24 mL 0    insulin lispro 100 UNIT/ML Injection Solution INJECT 70 UNITS UNDER THE SKIN VIA INSULIN PUMP DAILY 66 mL 5    insulin aspart 100 Units/mL Injection Solution 70 Units by Insulin pump route daily.        Glucagon (rDNA) 1 MG injection 1 mg  1 mg Subcutaneous Once         Glucagon (rDNA) 1 MG injection 1 mg  1 mg Subcutaneous Once            ALLERGIES:  Allergies   Allergen Reactions    Shellfish-Derived Products ANAPHYLAXIS and HIVES       SOCIAL HISTORY:    Social History     Socioeconomic History    Marital status: Single   Tobacco Use    Smoking status: Never    Smokeless tobacco: Never   Vaping Use    Vaping status: Never Used   Substance and Sexual Activity    Alcohol use: Yes     Alcohol/week: 0.0 standard drinks of alcohol     Comment: wine - every other weekend    Drug use: Not Currently     Types: Cannabis    Sexual activity: Yes     Birth control/protection: Injection     In restaurent  Smoking no  Marijuana no  Etoh occ  Drugs no    FAMILY HISTORY:   Family History   Problem Relation Age of Onset    Other (Other) Mother     Cancer Maternal Grandmother     Diabetes Paternal Grandfather     Diabetes Sister     Diabetes Brother     Diabetes Brother      t2 DM on father's side : F, PGF,  aunts and uncles  2 sisters with t 1DM   Brother with t1DM        PHYSICAL EXAM:   Height: 5' 3\" (160 cm) (03/24 1054)  Weight: 191 lb (86.6 kg) (03/24 1054)  BSA (Calculated - sq m): 1.9 sq meters (03/24  1054)  Pulse: 99 (03/24 1054)  BP: 136/72 (03/24 1054)  Temp: --  Do Not Use - Resp Rate: --  SpO2: --    Body mass index is 33.83 kg/m².  No lipohypertrophy        DATA:     Pertinent data reviewed   CT ABD 2020 ADRENALS:   No defined mass or abnormal enlargemen       Latest Reference Range & Units 02/21/24 11:01 05/09/24 16:03   HEMOGLOBIN A1C 4.3 - 5.6 %  10.5 !   HEMOGLOBIN A1c <5.7 % 9.4 (H)    ESTIMATED AVERAGE GLUCOSE 68 - 126 mg/dL 223 (H)    !: Data is abnormal  (H): Data is abnormally high  No results for input(s): \"TSH\", \"T4F\", \"T3F\", \"THYP\" in the last 72 hours.  No results found.  POC Glycohemoglobin [83260]        Component Value Flag Ref Range Units Status    HEMOGLOBIN A1C 9.3      4.3 - 5.6 % Final    Cartridge Lot# [id number]       Numeric Final    Cartridge Expiration Date 11/19/26       Date Final                    Orders Placed This Encounter   Procedures    POC Glycohemoglobin [29503]     Orders Placed This Encounter    POC Glycohemoglobin [40513]     Order Specific Question:   Release to patient     Answer:   Immediate    Glucagon (rDNA) 1 MG injection 1 mg          This is a specialized patient consultation in endocrinology and required comprehensive review of prior records, as well as current evaluation, with time required for consideration of complex endocrine issues and consultation. For this visit, I personally interviewed the patient, and family member if accompanied, performed the pertinent parts of the history and physical examination. ROS included screening for appropriate endocrine conditions.   Today's diagnosis and plan were reviewed in detail with the patient who states understanding and agrees with plan. I discussed with the patient possible diagnosis, differential diagnosis, need for work up, treatment options, alternatives and side effects.     Please see note for details about time spent which includes:   · pre-visit preparation  · reviewing records  · face to face time with  the patient   · timely documentation of the encounter  · ordering medications/tests  · communication with care team  · care coordination    I appreciate the opportunity to be part of your patient's medical care and will keep you, as the referring and primary physicians, informed about the care of your patient. Please feel free to contact me should you have any questions.      The 21st Century Cures Act makes medical notes like these available to patients in the interest of transparency. Please be advised this is a medical document. Medical documents are intended to carry relevant information, facts as evident, and the clinical opinion of the practitioner. The medical note is intended as peer to peer communication and may appear blunt or direct. It is written in medical language and may contain abbreviations or verbiage that are unfamiliar.     Booker Peters MD

## 2025-05-20 NOTE — ED PROVIDER NOTES
Patient Seen in: [facility] Emergency Department        History  Chief Complaint   Patient presents with    Eye Visual Problem    Headache     Stated Complaint: face pain, vomiting, dizzy    Subjective:   HPI            29-year-old female presents for evaluation of headache.  Patient reports acute onset left periorbital pain associated with blurred vision, nausea, vomiting, photophobia.  Was in her usual state of health prior to this.  No neck pain, fever, recent trauma, focal weakness or numbness.        Objective:     No pertinent past medical history.            No pertinent past surgical history.              No pertinent social history.                              Physical Exam    ED Triage Vitals [05/20/25 1556]   /88   Pulse 83   Resp 20   Temp 98.1 °F (36.7 °C)   Temp src Temporal   SpO2 99 %   O2 Device None (Room air)       Current Vitals:   Vital Signs  BP: 101/57  Pulse: 88  Resp: 18  Temp: 98.1 °F (36.7 °C)  Temp src: Temporal  MAP (mmHg): 70    Oxygen Therapy  SpO2: 95 %  O2 Device: None (Room air)            Physical Exam  Vitals and nursing note reviewed.   Constitutional:       General: She is not in acute distress.     Appearance: She is well-developed.   HENT:      Head: Normocephalic and atraumatic.   Eyes:      Conjunctiva/sclera: Conjunctivae normal.   Cardiovascular:      Rate and Rhythm: Normal rate and regular rhythm.      Heart sounds: Normal heart sounds.   Pulmonary:      Effort: Pulmonary effort is normal. No respiratory distress.      Breath sounds: Normal breath sounds.   Abdominal:      General: Bowel sounds are normal. There is no distension.      Palpations: Abdomen is soft.      Tenderness: There is no abdominal tenderness. There is no guarding or rebound.   Musculoskeletal:         General: Normal range of motion.      Cervical back: Normal range of motion and neck supple.   Skin:     General: Skin is warm and dry.      Findings: No rash.   Neurological:       General: No focal deficit present.      Mental Status: She is alert and oriented to person, place, and time.      Sensory: No sensory deficit.      Motor: No weakness.                 ED Course  Labs Reviewed   CBC WITH DIFFERENTIAL WITH PLATELET - Abnormal; Notable for the following components:       Result Value    HGB 11.7 (*)     All other components within normal limits   COMP METABOLIC PANEL (14) - Abnormal; Notable for the following components:    Glucose 113 (*)     Bilirubin, Total 0.2 (*)     All other components within normal limits   POCT PREGNANCY URINE - Normal   RAINBOW DRAW LAVENDER   RAINBOW DRAW LIGHT GREEN   RAINBOW DRAW BLUE   RAINBOW DRAW GOLD          Imaging Results Available and Reviewed while in ED:   CT BRAIN OR HEAD (CPT=70450)   Final Result   PROCEDURE: CT BRAIN OR HEAD (CPT=70450)       COMPARISON: [facility], CT BRAIN OR HEAD (CPT=70450),    4/30/2022, 4:36 PM.       INDICATIONS: Face pain, vomiting, and dizziness.       TECHNIQUE: CT images were obtained without contrast material.  Automated    exposure control for dose reduction was used.  Dose information is    transmitted to the ACR (American College of Radiology) NRDR (National    Radiology Data Registry) which includes the    Dose Index Registry.        FINDINGS:    CSF SPACES: Ventricles, cisterns, and sulci are appropriate for age.  No    hydrocephalus, subarachnoid hemorrhage, or mass.  No midline shift.     CEREBRUM: No edema, hemorrhage, mass, acute infarction, or significant    atrophy.     CEREBELLUM: No edema, hemorrhage, mass, acute infarction, or significant    atrophy.     BRAINSTEM: No edema, hemorrhage, mass, acute infarction, or significant    atrophy.     CALVARIUM: No apparent fracture, mass, or other significant visible    lesion.     SINUSES: Mild chronic inflammation within the paranasal sinuses.   ORBITS: Dysconjugate gaze.         OTHER: Negative.                     =====   CONCLUSION:    No  acute intracranial process.       Dictated by (CST): Nino Saldaña MD on 5/20/2025 at 5:31 PM        Finalized by (CST): Nino Saldaña MD on 5/20/2025 at 5:33 PM                   ED Medications Administered:   Medications   sodium chloride 0.9 % IV bolus 1,000 mL (0 mL Intravenous Stopped 5/20/25 1732)   metoclopramide (Reglan) 5 mg/mL injection 10 mg (10 mg Intravenous Given 5/20/25 1630)   diphenhydrAMINE (Benadryl) 50 mg/mL  injection 12.5 mg (12.5 mg Intravenous Given 5/20/25 1631)   ketorolac (Toradol) 30 MG/ML injection 30 mg (30 mg Intravenous Given 5/20/25 1841)         Vitals:    05/20/25 1556 05/20/25 1830 05/20/25 1846   BP: 149/88 101/57    Pulse: 83 87 88   Resp: 20 18    Temp: 98.1 °F (36.7 °C)     TempSrc: Temporal     SpO2: 99% 95%    Weight: 79.4 kg     Height: 160 cm (5' 3\")       *I personally reviewed and interpreted all ED vitals.                    MDM             Medical Decision Making  Differential diagnosis includes but is not limited to migraine, cluster headache, tension headache, glaucoma, subarachnoid hemorrhage    Patient with normal neurovascular exam, CT without acute finding, imaging obtained within 6 hours of onset.  Patient reports minimal relief of headache after IV fluids, Reglan, Benadryl.  Toradol ordered.  On reexamination patient reports feeling much better, would like to be discharged home.  Discussed supportive care at home, will give Reglan prescription as needed.  Encouraged strict return precautions as well as close outpatient follow-up.  Patient and boyfriend at the bedside verbalized understanding of and agreement with this plan.    Problems Addressed:  Acute nonintractable headache, unspecified headache type: acute illness or injury     Details: Differential diagnoses encompassed high-risk conditions with potential threats to life, limb, or major bodily functions, warranting comprehensive evaluation and high-complexity medical decision-making.      Amount and/or  Complexity of Data Reviewed  External Data Reviewed: labs.     Details: BMP stable compared to 2/6/2025, increase in hemoglobin compared to CBC from 2/21/2024  Labs: ordered.  Radiology: ordered.    Risk  Prescription drug management.        Disposition and Plan     Clinical Impression:  1. Acute nonintractable headache, unspecified headache type         Disposition:  Discharge  5/20/2025  7:24 pm    Follow-up:  Britney Watts, APRN  [id number] [address]  [phone]    Schedule an appointment as soon as possible for a visit  For follow up          Medications Prescribed:  Current Discharge Medication List        START taking these medications    Details   metoclopramide 10 MG Oral Tab Take 1 tablet (10 mg total) by mouth 3 (three) times daily as needed (nausea or vomiting).  Qty: 10 tablet, Refills: 0                   Supplementary Documentation:

## 2025-05-20 NOTE — ED INITIAL ASSESSMENT (HPI)
Pt with Hx of DMT1 to ED with c/o acute onset of right side HA, right side facial pain, right eye pain with feeling of pulsation, blurred vision, nausea, and photophobia, onset 30 minutes PTA. Pt states ibuprofen taken PTA.  No neuro deficits.      Insulin pump in place.

## 2025-05-21 NOTE — DISCHARGE INSTRUCTIONS
Push fluids and get rest.    Use Reglan as needed for nausea or headache.    Take Tylenol and/or ibuprofen at the first sign of a headache.    See primary care for follow-up appointment.    Return to the ER if you develop any worsening symptoms, change in vision, weakness or numbness, or any emergent concerns

## 2025-05-29 NOTE — PATIENT INSTRUCTIONS
RTC in 1 -2 mo  Last A1c value was 9.3% done 3/24/2025.  5/29/2025  a1c: 9.6    Keep auto mode all the time   Will schedule a follow up with diabetes educator   Avoid pregnancy till we control BG   Will target zero low BG first, then work on fixing hyperglycemia   Bolus for every carb you eat   new setting   Basal   1.5--> 1.1-1.3  unit/hr 27.6  ICR   8->10  ISF  35 ->38  AIT  4  Target 110   follow up with diabetes educator

## 2025-05-29 NOTE — PROGRESS NOTES
Reason for Visit:  uncontrolled t1DM  Requesting Physician:   .BRI Delgadillo  CHIEF COMPLAINT:    Chief Complaint   Patient presents with    Diabetes     F/u     HISTORY OF PRESENT ILLNESS:   Batsheva Marie is a 29 year old female who presents with uncontrolled complicated t1DM, DKA in the past, stillborn and planning pregnancy in the future.   Had severe low BG and migraines recently when was off dexcom.   We discussed her pump and CGM data  Works at [employer] and has to start at 5 AM sometimes. Active at work      She resumed pump in Nov /2024   LMP 5/2025 , regular   Has ketones strips  DM HISTORY  Diagnosed: t1DM since was 4 yr   DKA in the past ~ 12 x   Missed appt with her endo so was asked to look for another endocrine     Was getting heavy periods and had to get blood transfusion - last transfusion in 3/2024     CURRENT DIABETIC MEDICATIONS INCLUDE:  Using pump         HISTORY OF DIABETES COMPLICATIONS: :  History of Retinopathy: 2024    History of Neuropathy: no    History of Nephropathy: .no     ASSOCIATED COMPLICATIONS:   HTN:  yes  Hyperlipidemia: no  CAD/ASCVD/PAD/CVA:  no    HOME GLUCOSE READINGS:             Lab Results   Component Value Date    A1C 9.6 (A) 05/29/2025    A1C 9.3 (A) 03/24/2025    A1C 10.5 (A) 05/09/2024    A1C 9.4 (H) 02/21/2024    A1C 8.6 (H) 01/04/2024        DM quality measures:  A1C/Blood pressure: as reported above   Last dilated eye exam: No data recorded due now   Exam shows retinopathy? No data recorded  Last diabetic foot exam: No data recorded  Dentist : recommend every 6m  Nephropathy screening:   ace /arb rx:   no  LIPID screening: Cholesterol Meds:      Cholesterol: 210, done on 2/6/2025.  HDL Cholesterol: 37, done on 2/6/2025.  TriGlycerides 265, done on 2/6/2025.  LDL Cholesterol: 126, done on 2/6/2025.           ASSESSMENT AND PLAN:  Batsheva Marie is a 29 year old female who presents with  uncontrolled complicated t1DM, DKA in the past, stillborn and  planning pregnancy in the future.    She will benefit from close f/u and more education.   A1c is too high for pregnancy now   She understands risk/benefit   Hypoglycemia when off automode.      Plan   RTC in 1 -2 mo  Last A1c value was 9.6% done 5/29/2025.    Keep auto mode all the time     Will schedule a follow up with diabetes educator     Avoid pregnancy till we control BG     Will target zero low BG first, then work on fixing hyperglycemia   Bolus for every carb you eat   new setting   Basal   1.5--> 1.1-1.3  unit/hr 27.6  ICR   8->10  ISF  35 ->38  AIT  4  Target 110       Check blood sugar fasting, before meals and before bedtime.   If you have low blood sugar <70, take 15 grams of carb (8 oz juice or regular soda) and recheck in 15 minutes.    Follow up with podiatry and eye doctor annually.   Patients need to wear covered shoes all the time and check feet daily.   Bring your meter/BG log to the next visit.      Target A1c 6.5%  Target BG   BEFORE MEAL 100-130mg/dl  2hrs AFTER MEAL less than 180mg/dl          We discussed these in detail with the patient and negotiated which of numerous possible changes in the in the treatment plan that would be acceptable to them. The patient remains at ongoing is at high risk for complications related to uncontrolled diabetes and treatment.  The patient requires a great deal of self-management and support. We expect the patient's risk to be reduced with the changes to the treatment plan that we recommended today.   We reviewed a very large amount of complicated data including BG target range, basal insulin doses, meal and correction related insulin boluses, time of meal, size of meal, time of BG testing and insulin administration in relations to meals. We also reviewed self-testing BG results if available.     PAST MEDICAL HISTORY:   Past Medical History:    Anxiety    Calculus of kidney    Depression    History of blood transfusion    no reactions    Hyperlipidemia     Insomnia    Pneumonia due to organism    Seizure disorder (HCC)    last episode 2020    Type 1 diabetes mellitus (HCC)    Visual impairment       PAST SURGICAL HISTORY:   Past Surgical History:   Procedure Laterality Date    Continue insulin pump     Kidney stone 2014  Polyp removed      CURRENT MEDICATIONS:    No outpatient medications have been marked as taking for the 5/29/25 encounter (Office Visit) with Booker Peters MD.      Glucagon (rDNA) 1 MG injection 1 mg  1 mg Subcutaneous Once         Glucagon (rDNA) 1 MG injection 1 mg  1 mg Subcutaneous Once            ALLERGIES:  Allergies   Allergen Reactions    Shellfish-Derived Products ANAPHYLAXIS and HIVES       SOCIAL HISTORY:    Social History     Socioeconomic History    Marital status: Single   Tobacco Use    Smoking status: Never    Smokeless tobacco: Never   Vaping Use    Vaping status: Never Used   Substance and Sexual Activity    Alcohol use: Yes     Alcohol/week: 0.0 standard drinks of alcohol     Comment: wine - every other weekend    Drug use: Not Currently     Types: Cannabis    Sexual activity: Yes     Birth control/protection: Injection     In restaurent  Smoking no  Marijuana no  Etoh occ  Drugs no    FAMILY HISTORY:   Family History   Problem Relation Age of Onset    Other (Other) Mother     Cancer Maternal Grandmother     Diabetes Paternal Grandfather     Diabetes Sister     Diabetes Brother     Diabetes Brother      t2 DM on father's side : F, PGF,  aunts and uncles  2 sisters with t 1DM   Brother with t1DM        PHYSICAL EXAM:   Height: 5' 3\" (160 cm) (05/29 1122)  Weight: 187 lb (84.8 kg) (05/29 1122)  BSA (Calculated - sq m): 1.88 sq meters (05/29 1122)  Pulse: 105 (05/29 1122)  BP: 110/74 (05/29 1122)  Temp: --  Do Not Use - Resp Rate: --  SpO2: --    Body mass index is 33.13 kg/m².       DATA:     Pertinent data reviewed   CT ABD 2020 ADRENALS:   No defined mass or abnormal enlargemen       Latest Reference Range & Units 02/21/24  11:01 05/09/24 16:03   HEMOGLOBIN A1C 4.3 - 5.6 %  10.5 !   HEMOGLOBIN A1c <5.7 % 9.4 (H)    ESTIMATED AVERAGE GLUCOSE 68 - 126 mg/dL 223 (H)    !: Data is abnormal  (H): Data is abnormally high  No results for input(s): \"TSH\", \"T4F\", \"T3F\", \"THYP\" in the last 72 hours.  No results found.  POC Glycohemoglobin [55089]        Component Value Flag Ref Range Units Status    HEMOGLOBIN A1C 9.6      4.3 - 5.6 % Final    Cartridge Lot# [id number]       Numeric Final    Cartridge Expiration Date 1/2/27       Date Final                      Orders Placed This Encounter   Procedures    POC Glycohemoglobin [70232]     Orders Placed This Encounter    POC Glycohemoglobin [28516]     Release to patient:   Immediate          This is a specialized patient consultation in endocrinology and required comprehensive review of prior records, as well as current evaluation, with time required for consideration of complex endocrine issues and consultation. For this visit, I personally interviewed the patient, and family member if accompanied, performed the pertinent parts of the history and physical examination. ROS included screening for appropriate endocrine conditions.   Today's diagnosis and plan were reviewed in detail with the patient who states understanding and agrees with plan. I discussed with the patient possible diagnosis, differential diagnosis, need for work up, treatment options, alternatives and side effects.     Please see note for details about time spent which includes:   · pre-visit preparation  · reviewing records  · face to face time with the patient   · timely documentation of the encounter  · ordering medications/tests  · communication with care team  · care coordination    I appreciate the opportunity to be part of your patient's medical care and will keep you, as the referring and primary physicians, informed about the care of your patient. Please feel free to contact me should you have any questions.      The 21st  Century Cures Act makes medical notes like these available to patients in the interest of transparency. Please be advised this is a medical document. Medical documents are intended to carry relevant information, facts as evident, and the clinical opinion of the practitioner. The medical note is intended as peer to peer communication and may appear blunt or direct. It is written in medical language and may contain abbreviations or verbiage that are unfamiliar.     Booker Peters MD

## 2025-06-17 NOTE — ED PROVIDER NOTES
Patient Seen in: [facility] Emergency Department       The following individual(s) verbally consented to be recorded using ambient AI listening technology and understand that they can each withdraw their consent to this listening technology at any point by asking the clinician to turn off or pause the recording:    Patient name: Batsheva Marie  Additional names:        History  Chief Complaint   Patient presents with    Laceration/Abrasion     Left pinky suture problem     Stated Complaint: proximal 5th digit laceration repaired with 4 interrupted sutures, Pt. feels li*    Subjective:   HPI     Batsheva Marie is a 30 year old female who presents with hand pain and swelling following a laceration and suturing earlier today.    Earlier today, she sustained a laceration at work which required suturing. After returning home, her finger began to throb and bleed while on the phone with her sister. The bleeding seemed to originate from an area that was not stitched. She reports significant swelling and pain in the finger, described as 'shooting up to my pinky'.    The pain worsened approximately an hour before the visit. She describes the finger as feeling cold despite the bleeding. She has taken Advil for the pain, but it did not provide relief. The pain is severe and exacerbated by the swelling and pressure from the stitches.    She drove herself to the appointment. No fever or systemic signs of infection. No recent physical activity that could have exacerbated the condition.        Objective:     Past Medical History:    Anxiety    Calculus of kidney    Depression    History of blood transfusion    no reactions    Hyperlipidemia    Insomnia    Pneumonia due to organism    Seizure disorder (HCC)    last episode 2020    Type 1 diabetes mellitus (HCC)    Visual impairment              Past Surgical History:   Procedure Laterality Date    Continue insulin pump                  Social  History     Socioeconomic History    Marital status: Single   Tobacco Use    Smoking status: Never    Smokeless tobacco: Never   Vaping Use    Vaping status: Never Used   Substance and Sexual Activity    Alcohol use: Yes     Alcohol/week: 0.0 standard drinks of alcohol     Comment: wine - every other weekend    Drug use: Not Currently     Types: Cannabis    Sexual activity: Yes     Birth control/protection: Injection     Social Drivers of Health     Food Insecurity: Low Risk  (11/27/2023)    Received from [facility]    Food Insecurity     Have there been times that your food ran out, and you didn't have money to get more?: No     Are there times that you worry that this might happen?: No   Transportation Needs: Low Risk  (11/27/2023)    Received from [facility]    Transportation Needs     Do you have trouble getting transportation to medical appointments?: No                                Physical Exam    ED Triage Vitals [06/16/25 1920]   /85   Pulse 103   Resp 20   Temp 98.3 °F (36.8 °C)   Temp src Oral   SpO2 100 %   O2 Device None (Room air)       Current Vitals:   Vital Signs  BP: 131/75  Pulse: 102  Resp: 16  Temp: 98.3 °F (36.8 °C)  Temp src: Oral    Oxygen Therapy  SpO2: 97 %  O2 Device: None (Room air)            Physical Exam  Vitals and nursing note reviewed.   Constitutional:       Appearance: Normal appearance. She is well-developed. She is not ill-appearing or diaphoretic.   HENT:      Head: Normocephalic and atraumatic.      Right Ear: External ear normal.      Left Ear: External ear normal.      Nose: Nose normal. No nasal deformity.      Mouth/Throat:      Lips: Pink.   Eyes:      General: Lids are normal.      Extraocular Movements: Extraocular movements intact.   Pulmonary:      Effort: Pulmonary effort is normal. No respiratory distress.   Musculoskeletal:         General: No deformity. Normal range of motion.      Left hand: Swelling, laceration  and tenderness present. No bony tenderness. Normal capillary refill. Normal pulse.        Hands:    Skin:     General: Skin is dry.      Coloration: Skin is not cyanotic or pale.   Neurological:      General: No focal deficit present.      Mental Status: She is alert and oriented to person, place, and time.      Gait: Gait is intact.   Psychiatric:         Attention and Perception: Attention normal.         Mood and Affect: Mood normal.         Speech: Speech normal.                 ED Course  Labs Reviewed - No data to display                         MDM             Medical Decision Making  Differential diagnosis includes but is not limited to hematoma formation, nerve injury, etc    Assessment and Plan  Laceration with swelling and pain    She has an acute hand laceration with swelling and severe pain radiating to the pinky, along with a cold sensation in the finger. Increased pain and bleeding occurred about an hour ago, likely due to pressure from internal bleeding under the sutures. The bleeding relieved some pressure, aiding pain management. Swelling and pain should improve by tomorrow with proper care. There is no evidence of infection. Additional sutures or gluing are contraindicated as they may trap blood and worsen pain. Prescribe hydrocodone for pain management, to be taken with food. Advise icing the hand and keeping it elevated above heart level to reduce swelling and inflammation. Provide gauze and bandage for wound care. Change pharmacy to a 24-hour location for medication pickup.    Medical Record Review: I personally reviewed available prior medical records for any recent pertinent discharge summaries, testing, and procedures, and reviewed those reports.    Complicating factors: The patient  has a past medical history of Anxiety, Calculus of kidney, Depression, History of blood transfusion, Hyperlipidemia, Insomnia (02/2017), Pneumonia due to organism, Seizure disorder (HCC), Type 1 diabetes  mellitus (HCC), and Visual impairment. and  has a past surgical history that includes Continue insulin pump until further orders from endocrine. that contribute to the medical complexity of this ED evaluation.     Clinical impression as well as any lab results and radiology findings were discussed with the patient and/or caregiver. I personally reviewed all laboratory results and radiology images myself. Patient is advised to follow up with PCP for reevaluation. I provided discharge instructions and return precautions. Patient and/or caregiver voices understanding and agreement with the treatment plan. All questions were addressed and answered.         Amount and/or Complexity of Data Reviewed  External Data Reviewed: notes.     Details: IC note from earlier today reviewed, laceration occurred while cutting an avocado at work    Risk  Prescription drug management.        Disposition and Plan     Clinical Impression:  1. Laceration of left little finger without foreign body without damage to nail, initial encounter         Disposition:  Discharge  6/16/2025  8:27 pm    Follow-up:  No follow-up provider specified.        Medications Prescribed:  Discharge Medication List as of 6/16/2025  8:31 PM        START taking these medications    Details   HYDROcodone-acetaminophen 5-325 MG Oral Tab Take 1 tablet by mouth every 6 (six) hours as needed., Normal, Disp-4 tablet, R-0                   Supplementary Documentation:

## 2025-06-17 NOTE — ED INITIAL ASSESSMENT (HPI)
Pt was at urgent care earlier, Pt states she got sutures placed on left pinky lac today. Noticed increase in swelling around the area.

## 2025-06-17 NOTE — DISCHARGE INSTRUCTIONS
VISIT SUMMARY:  You came in today due to hand pain and swelling after a laceration and suturing earlier today. The pain worsened, and you experienced significant bleeding and swelling in your finger.    YOUR PLAN:  LACERATION WITH SWELLING AND PAIN: You have a hand laceration with swelling and severe pain radiating to your pinky, along with a cold sensation in the finger. The increased pain and bleeding are likely due to pressure from internal bleeding under the sutures.  -Take hydrocodone for pain management as prescribed, and make sure to take it with food.  -Ice your hand and keep it elevated above heart level to reduce swelling and inflammation.  -Use the provided gauze and bandage for wound care.  - your medication from the 24-hour pharmacy.    Contains text generated by Yinka

## 2025-07-08 NOTE — ED PROVIDER NOTES
Patient Seen in: [facility] Emergency Department        History  Chief Complaint   Patient presents with    Abdomen/Flank Pain     Stated Complaint: abd pain    Subjective:   HPI          Pt is 31 yo A1 who p/w left flank pain x1 day. Radiates to front. Feels like urine is discolored. +vomiting. No diarrhea or fevers. Has h/o kidney stones. No relief with ibuprofen. No dysuria or hematuria. No vaginal bleeding or discharge. LMP .  Currently rates pain 10/10.     Objective:     Past Medical History:    Anxiety    Calculus of kidney    Depression    History of blood transfusion    no reactions    Hyperlipidemia    Insomnia    Pneumonia due to organism    Seizure disorder (HCC)    last episode     Type 1 diabetes mellitus (HCC)    Visual impairment              Past Surgical History:   Procedure Laterality Date    Continue insulin pump                  Social History     Socioeconomic History    Marital status: Single   Tobacco Use    Smoking status: Never    Smokeless tobacco: Never   Vaping Use    Vaping status: Never Used   Substance and Sexual Activity    Alcohol use: Yes     Alcohol/week: 0.0 standard drinks of alcohol     Comment: wine - every other weekend    Drug use: Not Currently     Types: Cannabis    Sexual activity: Yes     Birth control/protection: Injection     Social Drivers of Health     Food Insecurity: Low Risk  (2023)    Received from [facility]    Food Insecurity     Have there been times that your food ran out, and you didn't have money to get more?: No     Are there times that you worry that this might happen?: No   Transportation Needs: Low Risk  (2023)    Received from [facility]    Transportation Needs     Do you have trouble getting transportation to medical appointments?: No                                Physical Exam    ED Triage Vitals   BP 25 2043 143/79   Pulse 25 2043 100   Resp 25 2043 20    Temp 07/07/25 2043 97.9 °F (36.6 °C)   Temp src 07/07/25 2043 Temporal   SpO2 07/07/25 2043 99 %   O2 Device 07/07/25 2230 None (Room air)       Current Vitals:   Vital Signs  BP: 117/72  Pulse: 89  Resp: 16  Temp: 97.9 °F (36.6 °C)  Temp src: Temporal  MAP (mmHg): 85    Oxygen Therapy  SpO2: 100 %  O2 Device: None (Room air)            Physical Exam  GENERAL: No acute distress, awake and alert  HEENT: EOMI, PERRL  Neck: supple  CV: RRR, no murmurs  Resp: CTAB, no wheezes or retractions  Ab: soft,  TTP in LLQ with guarding and rebound. +left CVAT  Extremities: FROM of all extremities  Neuro: CN intact, normal speech, normal gait, 5/5 motor strength in all extremities, no focal deficits  SKIN: warm, dry, no rashes          ED Course  Labs Reviewed   COMP METABOLIC PANEL (14) - Abnormal; Notable for the following components:       Result Value    Glucose 202 (*)     Alkaline Phosphatase 100 (*)     Bilirubin, Total 0.2 (*)     All other components within normal limits   URINALYSIS WITH CULTURE REFLEX - Abnormal; Notable for the following components:    Clarity Urine Turbid (*)     Glucose Urine >1000 (*)     Blood Urine 3+ (*)     Protein Urine 30 (*)     Leukocyte Esterase Urine 500 (*)     WBC Urine >50 (*)     RBC Urine >10 (*)     Squamous Epi. Cells Few (*)     WBC Clump Present (*)     All other components within normal limits   LIPASE - Normal   POCT PREGNANCY URINE - Normal   CBC WITH DIFFERENTIAL WITH PLATELET   RAINBOW DRAW LAVENDER   RAINBOW DRAW LIGHT GREEN   RAINBOW DRAW BLUE   RAINBOW DRAW GOLD   URINE CULTURE, ROUTINE                   MDM       Medical Decision Making  Ddx includes but not limited to: kidney stone, diverticulitis, UTI, ovarian cyst  Pt given toradol for pain  Labs reassuring. U/a positive for likely infection  Pt notified of ultrasound results. Does have some relief in pain. Drove to ED today and does not have ride home. She states she feels comfortable going home at this time.  Advised on return precautions.   Prior cultures reviewed-susceptible to bactrim in past.     Amount and/or Complexity of Data Reviewed  External Data Reviewed: labs and radiology.     Details: CT abdomen 2020 reviewed  Labs 6/2025 reviewed  Labs: ordered.  Radiology: ordered.     Details:   CT ABDOMEN AND PELVIS NONCON   Comparison: 12/31/2020     IMPRESSION:  Incidental detection of a 4 mm simple appearing cyst in the left adnexa, likely ovarian in origin.  No associated free fluid.  If there is pain that correlates to the left pelvis, consider further evaluation with pelvic ultrasound.  The appendix is normal.  Evaluation is somewhat limited due to lack of IV contrast.   Otherwise, unremarkable exam.      ULTRASOUND PELVIS      IMPRESSION:    There is a 3.8 cm simple cyst in the left ovary.  Otherwise, the left ovary appears normal with normal blood flow.  No free fluid.  No acute findings in the pelvis.          Risk  Prescription drug management.        Disposition and Plan     Clinical Impression:  1. Urinary tract infection without hematuria, site unspecified    2. Cyst of left ovary         Disposition:  Discharge  7/8/2025  1:00 am    Follow-up:  Nubia Ambrocio,   [address] [facility] [address]  [phone]    Follow up            Medications Prescribed:  Discharge Medication List as of 7/8/2025  1:03 AM        START taking these medications    Details   sulfamethoxazole-trimethoprim -160 MG Oral Tab per tablet Take 1 tablet by mouth 2 (two) times daily for 7 days., Normal, Disp-14 tablet, R-0      traMADol 50 MG Oral Tab Take 1-2 tablets ( mg total) by mouth every 6 (six) hours as needed for Pain., Normal, Disp-10 tablet, R-0                   Supplementary Documentation:

## 2025-07-08 NOTE — ED INITIAL ASSESSMENT (HPI)
Patient to the ED from home d/t left sided flank pain. States she's been having flank/abd pain al day today, endorses N/V. Denies diarrhea. States she has had a kidney stone in the past but this pain does not feel the same.

## (undated) DEVICE — ENCORE® LATEX MICRO SIZE 7, STERILE LATEX POWDER-FREE SURGICAL GLOVE: Brand: ENCORE

## (undated) DEVICE — JELLY,LUBE,STERILE,FLIP TOP,TUBE,2-OZ: Brand: MEDLINE

## (undated) DEVICE — KIT HYSTEROSCOPIC PROC INCL INFLO OUTFLO TB

## (undated) DEVICE — SOLUTION IRRIG 3000ML 0.9% NACL FLX CONT

## (undated) DEVICE — GAMMEX® PI HYBRID SIZE 6.5, STERILE POWDER-FREE SURGICAL GLOVE, POLYISOPRENE AND NEOPRENE BLEND: Brand: GAMMEX

## (undated) DEVICE — CANISTER SUCT 3000CC HI FLO DISP FOR FLD MGMT

## (undated) DEVICE — SOCK CNSTR 4IN TNPSL UNV SPEC

## (undated) DEVICE — SOLUTION IRRIG 1000ML 0.9% NACL USP BTL

## (undated) DEVICE — ELITE HYSTEROSCOPE SEAL: Brand: TRUCLEAR

## (undated) DEVICE — SOFT TISSUE SHAVER MINI: Brand: TRUCLEAR

## (undated) DEVICE — HYSTEROSCOPY: Brand: MEDLINE INDUSTRIES, INC.

## (undated) NOTE — LETTER
MINOR CASE LETTER      2/21/2024        Dear Batsheva,    Your are having a Dilation and Curettage, Hysteroscopy, Truclear Curettage on Wednesday,03/13/2024 at 2:00pm at [facility].    Do not eat or drink anything (including water) after midnight the night before surgery.    If your procedure is scheduled later in the day, then nothing by mouth for 6 hours before arrival to the hospital.    A prescription called Cytotec has been sent to your pharmacy, you must take it at bedtime the night before surgery.    You are to call this office if you have any cold or flu symptoms 2 days before your scheduled surgery.    Please avoid ALL aspirin and herbal supplement 7 days before surgery.  Avoid Ibuprofen, Motrin, Aleve, or Naprosyn for 3 days before surgery.    Please avoid ALL Cannabis use 24 hours prior to surgery.    You cannot wear hair pins,wigs,artificial nail or metallic nails/ nail polish for surgery.    You will be contacted by PreAdmission Testing (PAT) usually within the week before surgery.  They will take a short medical history and let you know if any preoperative testing is needed. If you have any questions for preadmission testing please feel free to contact them by calling [phone].    Per your insurance no prior authorization is needed for surgery.    Please make arrangements for someone to drive you home after your surgery.    Call our office now to schedule your post-operative appointment for 2 weeks after surgery.    Please feel free to contact our office at [phone] if you have any questions regarding these instructions or your procedure.      Sincerely,          Sundar Ramirez, DO  [facility] - OB/GYN  [address] [address] [phone]

## (undated) NOTE — ED AVS SNAPSHOT
E[address] 85   MRN: [id number]    Department:  [facility] Emergency Department   Date of Visit:  8/12/2017           Disclosure     Insurance plans vary and the physician(s) referred by the ER may not be covered by your plan.  Ple If you have been prescribed any medication(s), please fill your prescription right away and begin taking the medication(s) as directed    If the emergency physician has read X-rays, these will be re-interpreted by a radiologist.  If there is a significant

## (undated) NOTE — IP AVS SNAPSHOT
[facility] [place]  [address] [place], [address] ~ [phone]                Discharge Summary   3/29/2017    Tobias Negron           Admission Information        Provider Department    3/29/2017 Julia Luna MD  3ne-A 25 every morning and 15 every evening    Peggy Moe                        Insulin Lispro 100 UNIT/ML Sopn   Commonly known as:  HUMALOG KWIKPEN   What changed:    - medication strength  - how much to take  - when to take this   Next dose due:   Take as [facility] 75.4 (03/29/17)  19.4 (03/29/17)  3.8 (03/29/17)  0.6 (03/29/17)  0.5 -- (03/29/17)  4.92 (03/29/17)  1.27 (03/29/17)  0.25 (03/29/17)  0.04 (03/29/17)  4.48      Metabolic Lab Results  (Last result in the past 90 days)    HgbA1C Glucose BUN Creatinine Richard Enter your [employer] Activation Code exactly as it appears below along with your Zip Code and Date of Birth to complete the sign-up process. If you do not sign up before the expiration date, you must request a new code.     Your unique [employer] Access Code: [email or web address]

## (undated) NOTE — ED AVS SNAPSHOT
E[address]equiel 85   MRN: [id number]    Department:  [facility] Emergency Department   Date of Visit:  4/13/2018           Disclosure     Insurance plans vary and the physician(s) referred by the ER may not be covered by your plan.  Ple tell this physician (or your personal doctor if your instructions are to return to your personal doctor) about any new or lasting problems. The primary care or specialist physician will see patients referred from the [facility] Emergency Department.  Mimi Duran

## (undated) NOTE — ED AVS SNAPSHOT
E[address]equiel 85   MRN: [id number]    Department:  [facility] Emergency Department   Date of Visit:  8/21/2017           Disclosure     Insurance plans vary and the physician(s) referred by the ER may not be covered by your plan.  Ple If you have been prescribed any medication(s), please fill your prescription right away and begin taking the medication(s) as directed    If the emergency physician has read X-rays, these will be re-interpreted by a radiologist.  If there is a significant

## (undated) NOTE — LETTER
August 12, 2017    Patient: Radha Cornell   Date of Visit: 8/12/2017       To Whom It May Concern:    Radha Cornell was seen and treated in our emergency department on 8/12/2017. She can return to work on 8/13/17.     If you have any questions or

## (undated) NOTE — LETTER
Date & Time: 5/5/2022, 5:53 PM  Patient: Aung Fowler  Encounter Provider(s):    BRI Ching       To Whom It May Concern:    Dandy Lino was seen and treated in our department on 5/5/2022. She should not return to work until 5/9/2022.     If you have any questions or concerns, please do not hesitate to call.        _____________________________  Physician/APC Signature